# Patient Record
Sex: FEMALE | Race: BLACK OR AFRICAN AMERICAN | NOT HISPANIC OR LATINO | Employment: OTHER | URBAN - METROPOLITAN AREA
[De-identification: names, ages, dates, MRNs, and addresses within clinical notes are randomized per-mention and may not be internally consistent; named-entity substitution may affect disease eponyms.]

---

## 2017-02-07 ENCOUNTER — OUTSIDE FACILITY SERVICE (OUTPATIENT)
Dept: FAMILY MEDICINE CLINIC | Facility: CLINIC | Age: 77
End: 2017-02-07

## 2017-02-07 PROCEDURE — 99308 SBSQ NF CARE LOW MDM 20: CPT | Performed by: INTERNAL MEDICINE

## 2017-02-09 RX ORDER — OXYCODONE AND ACETAMINOPHEN 10; 325 MG/1; MG/1
TABLET ORAL
Qty: 180 TABLET | Refills: 0 | Status: SHIPPED | OUTPATIENT
Start: 2017-02-09 | End: 2017-03-23 | Stop reason: SDUPTHER

## 2017-03-07 ENCOUNTER — OUTSIDE FACILITY SERVICE (OUTPATIENT)
Dept: FAMILY MEDICINE CLINIC | Facility: CLINIC | Age: 77
End: 2017-03-07

## 2017-03-07 PROCEDURE — 99307 SBSQ NF CARE SF MDM 10: CPT | Performed by: NURSE PRACTITIONER

## 2017-03-23 RX ORDER — OXYCODONE AND ACETAMINOPHEN 10; 325 MG/1; MG/1
TABLET ORAL
Qty: 180 TABLET | Refills: 0 | Status: SHIPPED | OUTPATIENT
Start: 2017-03-23 | End: 2017-04-27 | Stop reason: SDUPTHER

## 2017-03-28 ENCOUNTER — OUTSIDE FACILITY SERVICE (OUTPATIENT)
Dept: FAMILY MEDICINE CLINIC | Facility: CLINIC | Age: 77
End: 2017-03-28

## 2017-03-28 PROCEDURE — 99308 SBSQ NF CARE LOW MDM 20: CPT | Performed by: INTERNAL MEDICINE

## 2017-04-25 ENCOUNTER — OUTSIDE FACILITY SERVICE (OUTPATIENT)
Dept: FAMILY MEDICINE CLINIC | Facility: CLINIC | Age: 77
End: 2017-04-25

## 2017-04-25 PROCEDURE — 99308 SBSQ NF CARE LOW MDM 20: CPT | Performed by: NURSE PRACTITIONER

## 2017-04-27 RX ORDER — OXYCODONE AND ACETAMINOPHEN 10; 325 MG/1; MG/1
TABLET ORAL
Qty: 180 TABLET | Refills: 0 | Status: SHIPPED | OUTPATIENT
Start: 2017-04-27 | End: 2017-05-30

## 2017-05-18 ENCOUNTER — OUTSIDE FACILITY SERVICE (OUTPATIENT)
Dept: FAMILY MEDICINE CLINIC | Facility: CLINIC | Age: 77
End: 2017-05-18

## 2017-05-18 PROCEDURE — G0439 PPPS, SUBSEQ VISIT: HCPCS | Performed by: NURSE PRACTITIONER

## 2017-05-25 ENCOUNTER — OUTSIDE FACILITY SERVICE (OUTPATIENT)
Dept: FAMILY MEDICINE CLINIC | Facility: CLINIC | Age: 77
End: 2017-05-25

## 2017-05-25 PROCEDURE — 99308 SBSQ NF CARE LOW MDM 20: CPT | Performed by: NURSE PRACTITIONER

## 2017-05-30 RX ORDER — OXYCODONE AND ACETAMINOPHEN 10; 325 MG/1; MG/1
1 TABLET ORAL
Qty: 180 TABLET | Refills: 0 | Status: SHIPPED | OUTPATIENT
Start: 2017-05-30 | End: 2017-06-16 | Stop reason: SDUPTHER

## 2017-06-17 RX ORDER — OXYCODONE AND ACETAMINOPHEN 10; 325 MG/1; MG/1
1 TABLET ORAL
Qty: 180 TABLET | Refills: 0 | Status: SHIPPED | OUTPATIENT
Start: 2017-06-17 | End: 2017-07-05 | Stop reason: SDUPTHER

## 2017-06-27 ENCOUNTER — OUTSIDE FACILITY SERVICE (OUTPATIENT)
Dept: FAMILY MEDICINE CLINIC | Facility: CLINIC | Age: 77
End: 2017-06-27

## 2017-06-27 PROCEDURE — 99308 SBSQ NF CARE LOW MDM 20: CPT | Performed by: INTERNAL MEDICINE

## 2017-07-05 RX ORDER — OXYCODONE AND ACETAMINOPHEN 10; 325 MG/1; MG/1
1 TABLET ORAL
Qty: 180 TABLET | Refills: 0 | Status: SHIPPED | OUTPATIENT
Start: 2017-07-05

## 2017-08-04 ENCOUNTER — OUTSIDE FACILITY SERVICE (OUTPATIENT)
Dept: FAMILY MEDICINE CLINIC | Facility: CLINIC | Age: 77
End: 2017-08-04

## 2017-08-04 PROCEDURE — 99308 SBSQ NF CARE LOW MDM 20: CPT | Performed by: NURSE PRACTITIONER

## 2017-12-22 ENCOUNTER — HOSPITAL ENCOUNTER (EMERGENCY)
Facility: HOSPITAL | Age: 77
Discharge: HOME | End: 2017-12-22
Attending: FAMILY MEDICINE | Admitting: FAMILY MEDICINE

## 2017-12-22 VITALS
RESPIRATION RATE: 18 BRPM | TEMPERATURE: 97.5 F | OXYGEN SATURATION: 100 % | HEIGHT: 69 IN | SYSTOLIC BLOOD PRESSURE: 154 MMHG | HEART RATE: 69 BPM | DIASTOLIC BLOOD PRESSURE: 78 MMHG

## 2017-12-22 DIAGNOSIS — M79.89 SWOLLEN FINGER: Primary | ICD-10-CM

## 2017-12-22 PROCEDURE — 99284 EMERGENCY DEPT VISIT MOD MDM: CPT

## 2017-12-22 RX ORDER — LIDOCAINE HYDROCHLORIDE 10 MG/ML
10 INJECTION, SOLUTION INFILTRATION; PERINEURAL ONCE
Status: DISCONTINUED | OUTPATIENT
Start: 2017-12-22 | End: 2017-12-22 | Stop reason: HOSPADM

## 2017-12-22 RX ORDER — MAGNESIUM HYDROXIDE/ALUMINUM HYDROXICE/SIMETHICONE 120; 1200; 1200 MG/30ML; MG/30ML; MG/30ML
20 SUSPENSION ORAL EVERY 6 HOURS PRN
COMMUNITY

## 2017-12-22 RX ORDER — GUAIFENESIN AND DEXTROMETHORPHAN HYDROBROMIDE 100; 10 MG/5ML; MG/5ML
10 SOLUTION ORAL EVERY 6 HOURS PRN
COMMUNITY

## 2017-12-22 RX ORDER — ONDANSETRON 4 MG/1
4 TABLET, FILM COATED ORAL EVERY 8 HOURS PRN
COMMUNITY

## 2017-12-22 RX ORDER — SUCRALFATE 1 G/1
1 TABLET ORAL 4 TIMES DAILY
COMMUNITY

## 2017-12-22 RX ORDER — DULOXETIN HYDROCHLORIDE 60 MG/1
60 CAPSULE, DELAYED RELEASE ORAL DAILY
COMMUNITY

## 2017-12-22 NOTE — ED NOTES
Pt arrived via [facility] from [facility]. She fell yesterday am and refused to go to ER for evaluation at that time. Pt has 4th finger pain with swelling. Pt has a ring that is tight and increasing her pain. Pt c/o headache, SOA and lower shin pain bilaterally to EMS. Sats 85% RA per EMS, placed on O2 @ 2L per NC.  0705 - Pt to room 14 - Sats checked on RA - 100%.  Ivonne Rose RN  12/22/17 0654       Ivonne Rose RN  12/22/17 0655       Ivonne Rose RN  12/22/17 0739

## 2017-12-22 NOTE — ED NOTES
Attempt to call [facility] to give return report. No answer after numerous rings. Will attempt again in 10 minutes.   [facility] notified of transfer needed. ETA 1100 am     Juan José Crawley RN  12/22/17 6885

## 2017-12-22 NOTE — ED PROVIDER NOTES
" EMERGENCY DEPARTMENT ENCOUNTER    CHIEF COMPLAINT  Chief Complaint: L fourth digit pain  History given by: Pt  History limited by: Nothing  Room Number: 14/14  PMD: Terese Song MD      HPI:  Pt is a 77 y.o. female with a hx of UTI and hypertension who presents complaining of L fourth digit pain secondary to a ring getting stuck onset one week ago. Pt denies nausea, vomiting, diarrhea, or any other symptoms at this time.    Duration:  One week  Onset: gradual  Timing: constant  Location: L fourth digit  Radiation: none  Quality: \"pain\"  Intensity/Severity: moderate  Progression: unchanged  Associated Symptoms: none  Aggravating Factors: none  Alleviating Factors: none  Previous Episodes: none  Treatment before arrival: Pt received no treatment PTA.    PAST MEDICAL HISTORY  Active Ambulatory Problems     Diagnosis Date Noted   • Possible Cerebrovascular accident (CVA) 12/25/2016   • Anemia, chronic disease 10/17/2012   • Chest pain on breathing 09/05/2013   • History of gastrointestinal hemorrhage 09/05/2013   • History of deep venous thrombosis 09/05/2013   • Hypercoagulable state 10/17/2012   • Labile hypertension 10/17/2012   • Hypersomnia 10/16/1991   • Iron deficiency anemia 09/11/2013   • Adiposity 10/17/2012   • Osteoarthritis 10/17/2012   • Transient alteration of awareness 12/25/2016   • Toxic metabolic encephalopathy multifactorial: volume dep., central acting drugs/UTI recurrent 12/25/2016   • Chronic tension-type headache, not intractable 12/25/2016   • Generalized weakness 12/25/2016   • Muscle twitching 12/25/2016   • Chronic kidney disease, stage II (mild) 12/25/2016   • Alzheimer's disease 12/25/2016   • Diverticulosis of large intestine 12/25/2016   • Primary narcolepsy without cataplexy 12/25/2016   • Manic depressive disorder 12/25/2016   • Decreased mobility 12/25/2016   • Anxiety and depression 12/25/2016   • UTI due to extended-spectrum beta lactamase (ESBL) producing Escherichia coli " recurrent 12/25/2016   • Risk for falls 12/25/2016     Resolved Ambulatory Problems     Diagnosis Date Noted   • No Resolved Ambulatory Problems     Past Medical History:   Diagnosis Date   • Abdominal hernia without obstruction and without gangrene    • Altered mental state    • Alzheimer disease    • Anemia    • Anxiety    • Dementia    • Depression    • Diaphragmatic hernia    • Difficulty walking    • Diverticulitis large intestine w/o perforation or abscess w/o bleeding    • ESBL (extended spectrum beta-lactamase) producing bacteria infection    • Falls    • Gastritis    • GI bleed    • Hypertension    • Karli    • Narcolepsy due to underlying condition without cataplexy    • UTI (urinary tract infection)        PAST SURGICAL HISTORY  Past Surgical History:   Procedure Laterality Date   • CHOLECYSTECTOMY  08/15/2008   • CYSTOSCOPY      multiple   • HEMORROIDECTOMY     • LAPAROSCOPIC HYSTERECTOMY  01/01/2000   • VENA CAVA FILTER INSERTION  2012       FAMILY HISTORY  Family History   Problem Relation Age of Onset   • Diabetes Other    • Coronary artery disease Other        SOCIAL HISTORY  Social History     Social History   • Marital status:      Spouse name: N/A   • Number of children: N/A   • Years of education: N/A     Occupational History   • Retired [employer]       Social History Main Topics   • Smoking status: Former Smoker     Packs/day: 1.00     Types: Cigarettes     Start date: 1960     Quit date: 1975   • Smokeless tobacco: Not on file      Comment: EX-SMOKER   • Alcohol use No   • Drug use: No   • Sexual activity: Not Currently     Partners: Male     Other Topics Concern   • Not on file     Social History Narrative       ALLERGIES  Levaquin [levofloxacin]; Macrobid [nitrofurantoin monohyd macro]; Penicillins; and Sulfa antibiotics    REVIEW OF SYSTEMS  Review of Systems   Constitutional: Negative for fever.   HENT: Negative for sore throat.    Eyes: Negative.    Respiratory: Negative  for cough and shortness of breath.    Cardiovascular: Negative for chest pain.   Gastrointestinal: Negative for abdominal pain, diarrhea and vomiting.   Genitourinary: Negative for dysuria.   Musculoskeletal: Positive for myalgias (L fourth digit). Negative for neck pain.   Skin: Negative for rash.   Allergic/Immunologic: Negative.    Neurological: Negative for weakness, numbness and headaches.   Hematological: Negative.    Psychiatric/Behavioral: Negative.    All other systems reviewed and are negative.      PHYSICAL EXAM  ED Triage Vitals   Temp Pulse Resp BP SpO2   -- -- -- -- --             Temp src Heart Rate Source Patient Position BP Location FiO2 (%)   -- -- -- -- --              Physical Exam   Constitutional: She is oriented to person, place, and time and well-developed, well-nourished, and in no distress. No distress.   HENT:   Head: Normocephalic and atraumatic.   Eyes: EOM are normal. Pupils are equal, round, and reactive to light.   Neck: Normal range of motion. Neck supple.   Cardiovascular: Normal rate, regular rhythm and normal heart sounds.    Pulmonary/Chest: Effort normal and breath sounds normal. No respiratory distress.   Abdominal: Soft. There is no tenderness. There is no rebound and no guarding.   Musculoskeletal: Normal range of motion. She exhibits no edema.   Neurological: She is alert and oriented to person, place, and time. She has normal sensation and normal strength.   Skin: Skin is warm and dry. No rash noted.   Pt has marked swelling distal to the ring of the fourth finger.   Psychiatric: Mood and affect normal.   Nursing note and vitals reviewed.      PROCEDURES  Procedures      PROGRESS AND CONSULTS  ED Course     0747 Ordered lidocaine to numb pt's finger prior to removal of her ring.    Ring cut off.    0820 BP- 134/64 HR- 80 Temp- 97.5 °F (36.4 °C) (Oral) O2 sat- 100%. Rechecked the patient who is in NAD and is resting comfortably. Suggested pt return to the ED for new or  worsening symptoms. Will discharge. Pt understands and agrees with the plan. All questions answered.    MEDICAL DECISION MAKING  Results were reviewed/discussed with the patient and they were also made aware of online access. Pt also made aware that some labs, such as cultures, will not be resulted during ER visit and follow up with PMD is necessary.     MDM  Number of Diagnoses or Management Options  Swollen finger due to constricting ring:      Amount and/or Complexity of Data Reviewed  Decide to obtain previous medical records or to obtain history from someone other than the patient: yes  Review and summarize past medical records: yes  Independent visualization of images, tracings, or specimens: yes    Patient Progress  Patient progress: stable         DIAGNOSIS  Final diagnoses:   Swollen finger due to constricting ring       DISPOSITION  DISCHARGE    Patient discharged in stable condition.    Reviewed implications of results, diagnosis, meds, responsibility to follow up, warning signs and symptoms of possible worsening, potential complications and reasons to return to ER, including new or worsening symptoms.    Patient/Family voiced understanding of above instructions.    Discussed plan for discharge, as there is no emergent indication for admission.  Pt/family is agreeable and understands need for follow up and repeat testing.  Pt is aware that discharge does not mean that nothing is wrong but it indicates no emergency is present that requires admission and they must continue care with follow-up as given below or physician of their choice.     FOLLOW-UP  Terese Song MD  [id number] [id number] [phone]      As needed         Medication List      Stop          aluminum-magnesium hydroxide-simethicone 200-200-20 MG/5ML suspension   Commonly known as:  MAALOX/MYLANTA               Latest Documented Vital Signs:  As of 8:25 AM  BP- 134/64 HR- 80 Temp- 97.5 °F (36.4 °C)  (Oral) O2 sat- 100%    --  Documentation assistance provided by patrica Tello for Dr. Valenzuela.  Information recorded by the scribe was done at my direction and has been verified and validated by me.     Katherin Tello  12/22/17 9101       Leonard Valenzuela MD  12/22/17 7066

## 2021-05-26 ENCOUNTER — HOSPITAL ENCOUNTER (EMERGENCY)
Facility: HOSPITAL | Age: 81
Discharge: NURSING, REHAB OR LONG-TERM CARE | End: 2021-05-26
Attending: EMERGENCY MEDICINE | Admitting: EMERGENCY MEDICINE

## 2021-05-26 ENCOUNTER — APPOINTMENT (OUTPATIENT)
Dept: CT IMAGING | Facility: HOSPITAL | Age: 81
End: 2021-05-26

## 2021-05-26 ENCOUNTER — APPOINTMENT (OUTPATIENT)
Dept: GENERAL RADIOLOGY | Facility: HOSPITAL | Age: 81
End: 2021-05-26

## 2021-05-26 VITALS
SYSTOLIC BLOOD PRESSURE: 104 MMHG | OXYGEN SATURATION: 96 % | DIASTOLIC BLOOD PRESSURE: 57 MMHG | HEART RATE: 49 BPM | TEMPERATURE: 97.6 F | RESPIRATION RATE: 16 BRPM

## 2021-05-26 DIAGNOSIS — R41.89 EPISODE OF UNRESPONSIVENESS: Primary | ICD-10-CM

## 2021-05-26 LAB
ALBUMIN SERPL-MCNC: 4 G/DL (ref 3.5–5.2)
ALBUMIN/GLOB SERPL: 1.2 G/DL
ALP SERPL-CCNC: 84 U/L (ref 39–117)
ALT SERPL W P-5'-P-CCNC: 13 U/L (ref 1–33)
ANION GAP SERPL CALCULATED.3IONS-SCNC: 9.8 MMOL/L (ref 5–15)
AST SERPL-CCNC: 15 U/L (ref 1–32)
BASOPHILS # BLD AUTO: 0.02 10*3/MM3 (ref 0–0.2)
BASOPHILS NFR BLD AUTO: 0.3 % (ref 0–1.5)
BILIRUB SERPL-MCNC: 0.2 MG/DL (ref 0–1.2)
BUN SERPL-MCNC: 19 MG/DL (ref 8–23)
BUN/CREAT SERPL: 17.6 (ref 7–25)
CALCIUM SPEC-SCNC: 9.6 MG/DL (ref 8.6–10.5)
CHLORIDE SERPL-SCNC: 104 MMOL/L (ref 98–107)
CO2 SERPL-SCNC: 22.2 MMOL/L (ref 22–29)
CREAT SERPL-MCNC: 1.08 MG/DL (ref 0.57–1)
DEPRECATED RDW RBC AUTO: 42.7 FL (ref 37–54)
EOSINOPHIL # BLD AUTO: 0.17 10*3/MM3 (ref 0–0.4)
EOSINOPHIL NFR BLD AUTO: 2.4 % (ref 0.3–6.2)
ERYTHROCYTE [DISTWIDTH] IN BLOOD BY AUTOMATED COUNT: 12.4 % (ref 12.3–15.4)
GFR SERPL CREATININE-BSD FRML MDRD: 59 ML/MIN/1.73
GLOBULIN UR ELPH-MCNC: 3.4 GM/DL
GLUCOSE SERPL-MCNC: 97 MG/DL (ref 65–99)
HCT VFR BLD AUTO: 37.9 % (ref 34–46.6)
HGB BLD-MCNC: 11.8 G/DL (ref 12–15.9)
IMM GRANULOCYTES # BLD AUTO: 0.02 10*3/MM3 (ref 0–0.05)
IMM GRANULOCYTES NFR BLD AUTO: 0.3 % (ref 0–0.5)
LYMPHOCYTES # BLD AUTO: 2.92 10*3/MM3 (ref 0.7–3.1)
LYMPHOCYTES NFR BLD AUTO: 40.6 % (ref 19.6–45.3)
MCH RBC QN AUTO: 28.9 PG (ref 26.6–33)
MCHC RBC AUTO-ENTMCNC: 31.1 G/DL (ref 31.5–35.7)
MCV RBC AUTO: 92.9 FL (ref 79–97)
MONOCYTES # BLD AUTO: 0.62 10*3/MM3 (ref 0.1–0.9)
MONOCYTES NFR BLD AUTO: 8.6 % (ref 5–12)
NEUTROPHILS NFR BLD AUTO: 3.45 10*3/MM3 (ref 1.7–7)
NEUTROPHILS NFR BLD AUTO: 47.8 % (ref 42.7–76)
NRBC BLD AUTO-RTO: 0 /100 WBC (ref 0–0.2)
PLATELET # BLD AUTO: 331 10*3/MM3 (ref 140–450)
PMV BLD AUTO: 9.7 FL (ref 6–12)
POTASSIUM SERPL-SCNC: 3.8 MMOL/L (ref 3.5–5.2)
PROT SERPL-MCNC: 7.4 G/DL (ref 6–8.5)
QT INTERVAL: 445 MS
RBC # BLD AUTO: 4.08 10*6/MM3 (ref 3.77–5.28)
SODIUM SERPL-SCNC: 136 MMOL/L (ref 136–145)
TROPONIN T SERPL-MCNC: <0.01 NG/ML (ref 0–0.03)
WBC # BLD AUTO: 7.2 10*3/MM3 (ref 3.4–10.8)

## 2021-05-26 PROCEDURE — 80053 COMPREHEN METABOLIC PANEL: CPT | Performed by: NURSE PRACTITIONER

## 2021-05-26 PROCEDURE — 93010 ELECTROCARDIOGRAM REPORT: CPT | Performed by: INTERNAL MEDICINE

## 2021-05-26 PROCEDURE — 85025 COMPLETE CBC W/AUTO DIFF WBC: CPT | Performed by: NURSE PRACTITIONER

## 2021-05-26 PROCEDURE — 70450 CT HEAD/BRAIN W/O DYE: CPT

## 2021-05-26 PROCEDURE — 99284 EMERGENCY DEPT VISIT MOD MDM: CPT

## 2021-05-26 PROCEDURE — 93005 ELECTROCARDIOGRAM TRACING: CPT | Performed by: NURSE PRACTITIONER

## 2021-05-26 PROCEDURE — 71045 X-RAY EXAM CHEST 1 VIEW: CPT

## 2021-05-26 PROCEDURE — 84484 ASSAY OF TROPONIN QUANT: CPT | Performed by: NURSE PRACTITIONER

## 2021-05-26 RX ORDER — SODIUM CHLORIDE 0.9 % (FLUSH) 0.9 %
10 SYRINGE (ML) INJECTION AS NEEDED
Status: DISCONTINUED | OUTPATIENT
Start: 2021-05-26 | End: 2021-05-26 | Stop reason: HOSPADM

## 2021-05-26 NOTE — ED NOTES
"Pt refused head CT stating \"aint nothing wrong with my head\" Dionna ROBBINS notified.      Rodolfo Pace RN  05/26/21 1186    "

## 2021-05-26 NOTE — ED NOTES
[facility] contacted about pt discharge and transport back to their facility. [facility] agreeable.      Rodolfo Pace RN  05/26/21 6661

## 2021-05-26 NOTE — ED PROVIDER NOTES
Pt presents to the ED from [facility] for an episode of unresponsiveness.  Staff at the nursing home reports they were unable to feel a pulse, however after 1 chest compressions the patient woke up and responded immediately.  She has a history of dementia and is unable to give any sort of reliable history.  She denies pain-specifically, she denies headache, chest pain or trouble breathing.     On exam,   Awake and alert.  She is pleasantly confused.  She is disoriented to place and time.  HEENT-normocephalic, atraumatic.  CV-regular rhythm and rate  Lungs-clear to auscultation bilaterally  Neuro-pleasantly confused as above, she has no focal neurologic deficits.  Extremities-no deformities.     Plan: Her labs are unremarkable.  She initially was refusing head CT, but family was contacted and she is agreeable to this.  I think this is normal she can be safely discharged back to her facility.      Appropriate PPE was worn by myself and the patient that her entire interaction.       Attestation:  The DEBI and I have discussed this patient's history, physical exam, and treatment plan.  I have reviewed the documentation and personally had a face to face interaction with the patient. I affirm the documentation and agree with the treatment and plan.  The attached note describes my personal findings.            Venkatesh Hall MD  05/26/21 3881

## 2021-05-26 NOTE — ED PROVIDER NOTES
EMERGENCY DEPARTMENT ENCOUNTER    Room Number:  26/26  Date of encounter:  5/26/2021  PCP: Terese Song MD  Historian: patient   Full history not obtainable due to: dementia     HPI:  Chief Complaint: ams     Context: Madeleine Tavares is a 80 y.o. female who presents to the ED c/o ams onset pta. Staff at the facility where she resides, [facility], reported they could not wake the pt. They were concerned she did not have a pulse and began cpr. After one chest compression the pt awoke screaming. The pt does not have any memory of this event. She does have a hx of dementia and is confused at baseline. She thinks she lives at home and is unsure of the year.     The hx is limited.         PAST MEDICAL HISTORY    Active Ambulatory Problems     Diagnosis Date Noted   • Possible Cerebrovascular accident (CVA) 12/25/2016   • Anemia, chronic disease 10/17/2012   • Chest pain on breathing 09/05/2013   • History of gastrointestinal hemorrhage 09/05/2013   • History of deep venous thrombosis 09/05/2013   • Hypercoagulable state (CMS/HCC) 10/17/2012   • Labile hypertension 10/17/2012   • Hypersomnia 10/16/1991   • Iron deficiency anemia 09/11/2013   • Adiposity 10/17/2012   • Osteoarthritis 10/17/2012   • Transient alteration of awareness 12/25/2016   • Toxic metabolic encephalopathy multifactorial: volume dep., central acting drugs/UTI recurrent 12/25/2016   • Chronic tension-type headache, not intractable 12/25/2016   • Generalized weakness 12/25/2016   • Muscle twitching 12/25/2016   • Chronic kidney disease, stage II (mild) 12/25/2016   • Alzheimer's disease (CMS/HCC) 12/25/2016   • Diverticulosis of large intestine 12/25/2016   • Primary narcolepsy without cataplexy 12/25/2016   • Manic depressive disorder (CMS/HCC) 12/25/2016   • Decreased mobility 12/25/2016   • Anxiety and depression 12/25/2016   • UTI due to extended-spectrum beta lactamase (ESBL) producing Escherichia coli recurrent 12/25/2016   • Risk for  falls 2016     Resolved Ambulatory Problems     Diagnosis Date Noted   • No Resolved Ambulatory Problems     Past Medical History:   Diagnosis Date   • Abdominal hernia without obstruction and without gangrene    • Altered mental state    • Alzheimer disease    • Anemia    • Anxiety    • Dementia    • Depression    • Diaphragmatic hernia    • Difficulty walking    • Diverticulitis large intestine w/o perforation or abscess w/o bleeding    • ESBL (extended spectrum beta-lactamase) producing bacteria infection    • Falls    • Gastritis    • GI bleed    • Hypertension    • Karli (CMS/HCC)    • Narcolepsy due to underlying condition without cataplexy    • UTI (urinary tract infection)          PAST SURGICAL HISTORY  Past Surgical History:   Procedure Laterality Date   • CHOLECYSTECTOMY  08/15/2008   • CYSTOSCOPY      multiple   • HEMORROIDECTOMY     • LAPAROSCOPIC HYSTERECTOMY  2000   • VENA CAVA FILTER INSERTION           FAMILY HISTORY  Family History   Problem Relation Age of Onset   • Diabetes Other    • Coronary artery disease Other          SOCIAL HISTORY  Social History     Socioeconomic History   • Marital status:      Spouse name: Not on file   • Number of children: Not on file   • Years of education: Not on file   • Highest education level: Not on file   Tobacco Use   • Smoking status: Former Smoker     Packs/day: 1.00     Types: Cigarettes     Start date:      Quit date:      Years since quittin.4   • Tobacco comment: EX-SMOKER   Substance and Sexual Activity   • Alcohol use: No   • Drug use: No   • Sexual activity: Not Currently     Partners: Male         ALLERGIES  Levaquin [levofloxacin], Macrobid [nitrofurantoin monohyd macro], Penicillins, and Sulfa antibiotics        REVIEW OF SYSTEMS  Review of Systems   All systems reviewed and marked as negative except as listed in HPI       PHYSICAL EXAM    I have reviewed the triage vital signs and nursing notes.    ED Triage  Vitals [05/26/21 1115]   Temp Heart Rate Resp BP SpO2   97.8 °F (36.6 °C) 52 18 127/66 94 %      Temp src Heart Rate Source Patient Position BP Location FiO2 (%)   -- -- -- -- --       GENERAL: alert well developed, well nourished in no distress. Pleasantly confused . Elderly appearing   HENT: NCAT, neck supple, trachea midline  EYES: no scleral icterus, left pupil briskly reactive, right pupil and cornea cloudy and obscured, normal conjunctivae  CV: regular rhythm, regular rate, no murmur  RESPIRATORY: unlabored effort, CTAB  ABDOMEN: soft, nontender, nondistended, bowel sounds present  MUSCULOSKELETAL: no gross deformity  NEURO: alert,  Moves all extremities, generalized weakness noted but no focal deficit, speech clear, mental status normal/baseline  SKIN: warm, dry, no rash  PSYCH:  Appropriate mood and affect    Vital signs and nursing notes reviewed.          LAB RESULTS  Recent Results (from the past 24 hour(s))   Comprehensive Metabolic Panel    Collection Time: 05/26/21 11:35 AM    Specimen: Blood   Result Value Ref Range    Glucose 97 65 - 99 mg/dL    BUN 19 8 - 23 mg/dL    Creatinine 1.08 (H) 0.57 - 1.00 mg/dL    Sodium 136 136 - 145 mmol/L    Potassium 3.8 3.5 - 5.2 mmol/L    Chloride 104 98 - 107 mmol/L    CO2 22.2 22.0 - 29.0 mmol/L    Calcium 9.6 8.6 - 10.5 mg/dL    Total Protein 7.4 6.0 - 8.5 g/dL    Albumin 4.00 3.50 - 5.20 g/dL    ALT (SGPT) 13 1 - 33 U/L    AST (SGOT) 15 1 - 32 U/L    Alkaline Phosphatase 84 39 - 117 U/L    Total Bilirubin 0.2 0.0 - 1.2 mg/dL    eGFR  African Amer 59 (L) >60 mL/min/1.73    Globulin 3.4 gm/dL    A/G Ratio 1.2 g/dL    BUN/Creatinine Ratio 17.6 7.0 - 25.0    Anion Gap 9.8 5.0 - 15.0 mmol/L   Troponin    Collection Time: 05/26/21 11:35 AM    Specimen: Blood   Result Value Ref Range    Troponin T <0.010 0.000 - 0.030 ng/mL   CBC Auto Differential    Collection Time: 05/26/21 11:35 AM    Specimen: Blood   Result Value Ref Range    WBC 7.20 3.40 - 10.80 10*3/mm3    RBC  4.08 3.77 - 5.28 10*6/mm3    Hemoglobin 11.8 (L) 12.0 - 15.9 g/dL    Hematocrit 37.9 34.0 - 46.6 %    MCV 92.9 79.0 - 97.0 fL    MCH 28.9 26.6 - 33.0 pg    MCHC 31.1 (L) 31.5 - 35.7 g/dL    RDW 12.4 12.3 - 15.4 %    RDW-SD 42.7 37.0 - 54.0 fl    MPV 9.7 6.0 - 12.0 fL    Platelets 331 140 - 450 10*3/mm3    Neutrophil % 47.8 42.7 - 76.0 %    Lymphocyte % 40.6 19.6 - 45.3 %    Monocyte % 8.6 5.0 - 12.0 %    Eosinophil % 2.4 0.3 - 6.2 %    Basophil % 0.3 0.0 - 1.5 %    Immature Grans % 0.3 0.0 - 0.5 %    Neutrophils, Absolute 3.45 1.70 - 7.00 10*3/mm3    Lymphocytes, Absolute 2.92 0.70 - 3.10 10*3/mm3    Monocytes, Absolute 0.62 0.10 - 0.90 10*3/mm3    Eosinophils, Absolute 0.17 0.00 - 0.40 10*3/mm3    Basophils, Absolute 0.02 0.00 - 0.20 10*3/mm3    Immature Grans, Absolute 0.02 0.00 - 0.05 10*3/mm3    nRBC 0.0 0.0 - 0.2 /100 WBC   ECG 12 Lead    Collection Time: 05/26/21 11:47 AM   Result Value Ref Range    QT Interval 445 ms       Ordered the above labs and independently reviewed the results.        RADIOLOGY  CT Head Without Contrast    Result Date: 5/26/2021  CT HEAD WITHOUT CONTRAST  HISTORY: Altered mental status.  COMPARISON: 12/26/2016.  FINDINGS: There is age-appropriate atrophy. There is no evidence of acute infarction, intracranial hemorrhage, hydrocephalus or of abnormal extra-axial fluid. Decreased attenuation involving the white matter of the cerebral hemispheres is noted bilaterally consistent with mild small vessel ischemic disease. No focal area of decreased attenuation to suggest acute infarction is identified. A scleral band is noted on the right. If indicated, further evaluation could be performed with MRI examination of brain.    Radiation dose reduction techniques were utilized, including automated exposure control and exposure modulation based on body size.  This report was finalized on 5/26/2021 2:31 PM by Dr. Martin Samaniego M.D.      XR Chest 1 View    Result Date: 5/26/2021  XR CHEST 1 VW-   05/26/2021  HISTORY: Altered mental status.  Heart size is within normal limits. Lungs appear free of acute infiltrates. A hiatal hernia is seen.  Bones and soft tissues are unremarkable.      No acute process. 2. Hiatal hernia.  This report was finalized on 5/26/2021 12:11 PM by Dr. Jose Newsome M.D.        I ordered the above noted radiological studies. Independently reviewed by me and discussed with radiologist.  See dictation above for official radiology interpretation.      PROCEDURES    Procedures        MEDICATIONS GIVEN IN ER    Medications   sodium chloride 0.9 % flush 10 mL (has no administration in time range)         PROGRESS, DATA ANALYSIS, CONSULTS, AND MEDICAL DECISION MAKING    All labs have been independently reviewed by me.  All radiology studies have been reviewed by me.   EKG's independently reviewed by me.  Discussion below represents my analysis of pertinent findings related to patient's condition, differential diagnosis, treatment plan and final disposition.    DIFFERENTIAL DIAGNOSIS INCLUDE BUT NOT LIMITED TO: Metabolic encephalopathy, SIMEON, dehydration, anemia, arrhythmia, AMI, USA, viral syndrome, COVID-19, pneumonia,, subarachnoid hemorrhage, subdural hematoma, brain tumor, withdrawal, polypharmacy      ED Course as of May 26 1757   Wed May 26, 2021   1234 BUN: 19 [JS]   1234 Creatinine(!): 1.08 [JS]   1234 Troponin T: <0.010 [JS]   1234 WBC: 7.20 [JS]   1234 Hemoglobin(!): 11.8 [JS]   1240 Patient is declining to have a CT.    [WC]   1252 EKG performed at 1147 and interpreted by me shows sinus bradycardia with a prolonged IA interval consistent with first-degree AV block.  QRS complexes and ST-T segments are unremarkable.  There is no significant change when compared to 12/27/2016.    [WC]   1355 CT of the brain was independently viewed by me and discussed with Dr. Samaniego (radiology)-there are no acute processes identified.  For official interpretation, see dictated report.    [WC]       ED Course User Index  [JS] Milly Post APRN  [WC] Venkatesh Hall MD       AS OF 17:57 EDT VITALS:        BP - 104/57  HR - (!) 49  TEMP - 97.6 °F (36.4 °C) (Oral)  O2 SATS - 96%        DIAGNOSIS  Final diagnoses:   Episode of unresponsiveness         DISPOSITION  Discharge     Pt masked in first look. I wore appropriate PPE throughout my encounters with the pt. I performed hand hygiene on entry into the pt room and upon exit.     Dictated utilizing Dragon dictation:  Much of this encounter note is an electronic transcription/translation of spoken language to printed text. The electronic translation of spoken language may permit erroneous, or at times, nonsensical words or phrases to be inadvertently transcribed; Although I have reviewed the note for such errors, some may still exist.     Milly Post APRN  05/26/21 8674

## 2021-05-26 NOTE — ED NOTES
Pt refusing to be catheterized for a urine sample at this time. MD Rafael aware. He is okay without a urine sample at this time.      Uyen Mehta, RN  05/26/21 1744

## 2021-05-26 NOTE — ED NOTES
Pt to ER via [facility] from [facility]. Pt found unresponsive. Nursing facility did one set of compressions due to them saying pt had no pulse - pt responded immediately. Pt hx of dementia - A&Ox1. Pt is baseline    Pt wearing mask. RN wearing mask, face shield and gown.         Kaylen Emanuel, RN  05/26/21 4185

## 2025-07-01 ENCOUNTER — APPOINTMENT (OUTPATIENT)
Dept: GENERAL RADIOLOGY | Facility: HOSPITAL | Age: 85
End: 2025-07-01
Payer: MEDICARE

## 2025-07-01 ENCOUNTER — APPOINTMENT (OUTPATIENT)
Dept: CT IMAGING | Facility: HOSPITAL | Age: 85
End: 2025-07-01
Payer: MEDICARE

## 2025-07-01 ENCOUNTER — HOSPITAL ENCOUNTER (INPATIENT)
Facility: HOSPITAL | Age: 85
LOS: 6 days | Discharge: HOME | End: 2025-07-08
Attending: EMERGENCY MEDICINE | Admitting: INTERNAL MEDICINE
Payer: MEDICARE

## 2025-07-01 DIAGNOSIS — N30.00 ACUTE CYSTITIS WITHOUT HEMATURIA: Primary | ICD-10-CM

## 2025-07-01 DIAGNOSIS — R93.0 ABNORMAL HEAD CT: ICD-10-CM

## 2025-07-01 DIAGNOSIS — R41.82 ALTERED MENTAL STATUS, UNSPECIFIED ALTERED MENTAL STATUS TYPE: ICD-10-CM

## 2025-07-01 PROBLEM — E16.2 HYPOGLYCEMIA: Status: ACTIVE | Noted: 2025-07-01

## 2025-07-01 PROBLEM — E11.9 DM (DIABETES MELLITUS): Status: ACTIVE | Noted: 2025-07-01

## 2025-07-01 PROBLEM — N39.0 UTI (URINARY TRACT INFECTION): Status: ACTIVE | Noted: 2025-07-01

## 2025-07-01 LAB
ALBUMIN SERPL-MCNC: 3.5 G/DL (ref 3.5–5.2)
ALBUMIN/GLOB SERPL: 0.8 G/DL
ALP SERPL-CCNC: 72 U/L (ref 39–117)
ALT SERPL W P-5'-P-CCNC: 6 U/L (ref 1–33)
ANION GAP SERPL CALCULATED.3IONS-SCNC: 10 MMOL/L (ref 5–15)
AST SERPL-CCNC: 18 U/L (ref 1–32)
BACTERIA UR QL AUTO: ABNORMAL /HPF
BASOPHILS # BLD AUTO: 0.03 10*3/MM3 (ref 0–0.2)
BASOPHILS NFR BLD AUTO: 0.5 % (ref 0–1.5)
BILIRUB SERPL-MCNC: 0.2 MG/DL (ref 0–1.2)
BILIRUB UR QL STRIP: NEGATIVE
BUN SERPL-MCNC: 13 MG/DL (ref 8–23)
BUN/CREAT SERPL: 12.4 (ref 7–25)
CALCIUM SPEC-SCNC: 9.7 MG/DL (ref 8.6–10.5)
CHLORIDE SERPL-SCNC: 104 MMOL/L (ref 98–107)
CLARITY UR: ABNORMAL
CO2 SERPL-SCNC: 25 MMOL/L (ref 22–29)
COLOR UR: YELLOW
CREAT SERPL-MCNC: 1.05 MG/DL (ref 0.57–1)
DEPRECATED RDW RBC AUTO: 38.7 FL (ref 37–54)
EGFRCR SERPLBLD CKD-EPI 2021: 52.8 ML/MIN/1.73
EOSINOPHIL # BLD AUTO: 0.22 10*3/MM3 (ref 0–0.4)
EOSINOPHIL NFR BLD AUTO: 3.8 % (ref 0.3–6.2)
ERYTHROCYTE [DISTWIDTH] IN BLOOD BY AUTOMATED COUNT: 12 % (ref 12.3–15.4)
GEN 5 1HR TROPONIN T REFLEX: 15 NG/L
GLOBULIN UR ELPH-MCNC: 4.2 GM/DL
GLUCOSE BLDC GLUCOMTR-MCNC: 94 MG/DL (ref 70–130)
GLUCOSE SERPL-MCNC: 99 MG/DL (ref 65–99)
GLUCOSE UR STRIP-MCNC: NEGATIVE MG/DL
GRAN CASTS URNS QL MICRO: PRESENT /LPF
HCT VFR BLD AUTO: 39.2 % (ref 34–46.6)
HGB BLD-MCNC: 12.2 G/DL (ref 12–15.9)
HGB UR QL STRIP.AUTO: NEGATIVE
HYALINE CASTS UR QL AUTO: ABNORMAL /LPF
IMM GRANULOCYTES # BLD AUTO: 0.02 10*3/MM3 (ref 0–0.05)
IMM GRANULOCYTES NFR BLD AUTO: 0.3 % (ref 0–0.5)
KETONES UR QL STRIP: NEGATIVE
LEUKOCYTE ESTERASE UR QL STRIP.AUTO: ABNORMAL
LYMPHOCYTES # BLD AUTO: 2.28 10*3/MM3 (ref 0.7–3.1)
LYMPHOCYTES NFR BLD AUTO: 39.6 % (ref 19.6–45.3)
MCH RBC QN AUTO: 27.4 PG (ref 26.6–33)
MCHC RBC AUTO-ENTMCNC: 31.1 G/DL (ref 31.5–35.7)
MCV RBC AUTO: 87.9 FL (ref 79–97)
MONOCYTES # BLD AUTO: 0.57 10*3/MM3 (ref 0.1–0.9)
MONOCYTES NFR BLD AUTO: 9.9 % (ref 5–12)
NEUTROPHILS NFR BLD AUTO: 2.64 10*3/MM3 (ref 1.7–7)
NEUTROPHILS NFR BLD AUTO: 45.9 % (ref 42.7–76)
NITRITE UR QL STRIP: POSITIVE
NRBC BLD AUTO-RTO: 0 /100 WBC (ref 0–0.2)
PH UR STRIP.AUTO: 8.5 [PH] (ref 5–8)
PLATELET # BLD AUTO: 381 10*3/MM3 (ref 140–450)
PMV BLD AUTO: 10 FL (ref 6–12)
POTASSIUM SERPL-SCNC: 4.7 MMOL/L (ref 3.5–5.2)
PROT SERPL-MCNC: 7.7 G/DL (ref 6–8.5)
PROT UR QL STRIP: ABNORMAL
RBC # BLD AUTO: 4.46 10*6/MM3 (ref 3.77–5.28)
RBC # UR STRIP: ABNORMAL /HPF
REF LAB TEST METHOD: ABNORMAL
SODIUM SERPL-SCNC: 139 MMOL/L (ref 136–145)
SP GR UR STRIP: 1.01 (ref 1–1.03)
SQUAMOUS #/AREA URNS HPF: ABNORMAL /HPF
TRI-PHOS CRY URNS QL MICRO: ABNORMAL /HPF
TROPONIN T % DELTA: 0
TROPONIN T NUMERIC DELTA: 0 NG/L
TROPONIN T SERPL HS-MCNC: 15 NG/L
UROBILINOGEN UR QL STRIP: ABNORMAL
WBC # UR STRIP: ABNORMAL /HPF
WBC CLUMPS # UR AUTO: PRESENT /HPF
WBC NRBC COR # BLD AUTO: 5.76 10*3/MM3 (ref 3.4–10.8)

## 2025-07-01 PROCEDURE — 81001 URINALYSIS AUTO W/SCOPE: CPT | Performed by: EMERGENCY MEDICINE

## 2025-07-01 PROCEDURE — 82948 REAGENT STRIP/BLOOD GLUCOSE: CPT

## 2025-07-01 PROCEDURE — 70450 CT HEAD/BRAIN W/O DYE: CPT

## 2025-07-01 PROCEDURE — 93005 ELECTROCARDIOGRAM TRACING: CPT | Performed by: EMERGENCY MEDICINE

## 2025-07-01 PROCEDURE — 87186 SC STD MICRODIL/AGAR DIL: CPT | Performed by: EMERGENCY MEDICINE

## 2025-07-01 PROCEDURE — 99285 EMERGENCY DEPT VISIT HI MDM: CPT

## 2025-07-01 PROCEDURE — 25010000002 CEFTRIAXONE PER 250 MG: Performed by: EMERGENCY MEDICINE

## 2025-07-01 PROCEDURE — 87088 URINE BACTERIA CULTURE: CPT | Performed by: EMERGENCY MEDICINE

## 2025-07-01 PROCEDURE — P9612 CATHETERIZE FOR URINE SPEC: HCPCS

## 2025-07-01 PROCEDURE — 84484 ASSAY OF TROPONIN QUANT: CPT | Performed by: EMERGENCY MEDICINE

## 2025-07-01 PROCEDURE — 87086 URINE CULTURE/COLONY COUNT: CPT | Performed by: EMERGENCY MEDICINE

## 2025-07-01 PROCEDURE — 93010 ELECTROCARDIOGRAM REPORT: CPT | Performed by: INTERNAL MEDICINE

## 2025-07-01 PROCEDURE — G0378 HOSPITAL OBSERVATION PER HR: HCPCS

## 2025-07-01 PROCEDURE — 80053 COMPREHEN METABOLIC PANEL: CPT | Performed by: EMERGENCY MEDICINE

## 2025-07-01 PROCEDURE — 85025 COMPLETE CBC W/AUTO DIFF WBC: CPT | Performed by: EMERGENCY MEDICINE

## 2025-07-01 PROCEDURE — 36415 COLL VENOUS BLD VENIPUNCTURE: CPT | Performed by: EMERGENCY MEDICINE

## 2025-07-01 PROCEDURE — 25010000002 MEROPENEM PER 100 MG: Performed by: INTERNAL MEDICINE

## 2025-07-01 PROCEDURE — 71045 X-RAY EXAM CHEST 1 VIEW: CPT

## 2025-07-01 RX ORDER — BISACODYL 10 MG
10 SUPPOSITORY, RECTAL RECTAL AS NEEDED
COMMUNITY

## 2025-07-01 RX ORDER — ALBUTEROL SULFATE 90 UG/1
2 INHALANT RESPIRATORY (INHALATION) EVERY 6 HOURS PRN
COMMUNITY

## 2025-07-01 RX ORDER — NITROGLYCERIN 0.4 MG/1
0.4 TABLET SUBLINGUAL
Status: DISCONTINUED | OUTPATIENT
Start: 2025-07-01 | End: 2025-07-01 | Stop reason: SDUPTHER

## 2025-07-01 RX ORDER — POLYETHYLENE GLYCOL 3350 17 G/17G
17 POWDER, FOR SOLUTION ORAL DAILY PRN
Status: DISCONTINUED | OUTPATIENT
Start: 2025-07-01 | End: 2025-07-08 | Stop reason: HOSPADM

## 2025-07-01 RX ORDER — NITROGLYCERIN 0.4 MG/1
0.4 TABLET SUBLINGUAL
Status: DISCONTINUED | OUTPATIENT
Start: 2025-07-01 | End: 2025-07-08 | Stop reason: HOSPADM

## 2025-07-01 RX ORDER — LACTULOSE 10 G/15ML
20 SOLUTION ORAL 2 TIMES DAILY PRN
COMMUNITY

## 2025-07-01 RX ORDER — AMOXICILLIN 250 MG
2 CAPSULE ORAL DAILY
COMMUNITY

## 2025-07-01 RX ORDER — DEXTROSE MONOHYDRATE 25 G/50ML
25 INJECTION, SOLUTION INTRAVENOUS
Status: DISCONTINUED | OUTPATIENT
Start: 2025-07-01 | End: 2025-07-08 | Stop reason: HOSPADM

## 2025-07-01 RX ORDER — ONDANSETRON 4 MG/1
4 TABLET, ORALLY DISINTEGRATING ORAL EVERY 6 HOURS PRN
Status: DISCONTINUED | OUTPATIENT
Start: 2025-07-01 | End: 2025-07-08 | Stop reason: HOSPADM

## 2025-07-01 RX ORDER — ALBUTEROL SULFATE 0.83 MG/ML
2.5 SOLUTION RESPIRATORY (INHALATION) EVERY 6 HOURS PRN
Status: DISCONTINUED | OUTPATIENT
Start: 2025-07-01 | End: 2025-07-08 | Stop reason: HOSPADM

## 2025-07-01 RX ORDER — SODIUM CHLORIDE 9 MG/ML
40 INJECTION, SOLUTION INTRAVENOUS AS NEEDED
Status: DISCONTINUED | OUTPATIENT
Start: 2025-07-01 | End: 2025-07-08 | Stop reason: HOSPADM

## 2025-07-01 RX ORDER — ACETAMINOPHEN 650 MG/1
650 SUPPOSITORY RECTAL EVERY 4 HOURS PRN
Status: DISCONTINUED | OUTPATIENT
Start: 2025-07-01 | End: 2025-07-08 | Stop reason: HOSPADM

## 2025-07-01 RX ORDER — POLYETHYLENE GLYCOL 3350 17 G/17G
17 POWDER, FOR SOLUTION ORAL DAILY
Status: DISCONTINUED | OUTPATIENT
Start: 2025-07-02 | End: 2025-07-08 | Stop reason: HOSPADM

## 2025-07-01 RX ORDER — AMOXICILLIN 250 MG
2 CAPSULE ORAL DAILY
Status: DISCONTINUED | OUTPATIENT
Start: 2025-07-02 | End: 2025-07-08 | Stop reason: HOSPADM

## 2025-07-01 RX ORDER — POLYETHYLENE GLYCOL 3350 17 G/17G
17 POWDER, FOR SOLUTION ORAL DAILY
COMMUNITY

## 2025-07-01 RX ORDER — OMEPRAZOLE 10 MG/1
10 CAPSULE, DELAYED RELEASE ORAL DAILY
COMMUNITY

## 2025-07-01 RX ORDER — GUAIFENESIN 200 MG/10ML
200 LIQUID ORAL 3 TIMES DAILY PRN
Status: DISCONTINUED | OUTPATIENT
Start: 2025-07-01 | End: 2025-07-08 | Stop reason: HOSPADM

## 2025-07-01 RX ORDER — NICOTINE POLACRILEX 4 MG
15 LOZENGE BUCCAL
Status: DISCONTINUED | OUTPATIENT
Start: 2025-07-01 | End: 2025-07-08 | Stop reason: HOSPADM

## 2025-07-01 RX ORDER — BUMETANIDE 1 MG/1
0.5 TABLET ORAL DAILY
Status: DISCONTINUED | OUTPATIENT
Start: 2025-07-02 | End: 2025-07-02

## 2025-07-01 RX ORDER — BISACODYL 5 MG/1
5 TABLET, DELAYED RELEASE ORAL DAILY PRN
Status: DISCONTINUED | OUTPATIENT
Start: 2025-07-01 | End: 2025-07-08 | Stop reason: HOSPADM

## 2025-07-01 RX ORDER — ONDANSETRON 2 MG/ML
4 INJECTION INTRAMUSCULAR; INTRAVENOUS EVERY 6 HOURS PRN
Status: DISCONTINUED | OUTPATIENT
Start: 2025-07-01 | End: 2025-07-08 | Stop reason: HOSPADM

## 2025-07-01 RX ORDER — LACTULOSE 10 G/15ML
20 SOLUTION ORAL 2 TIMES DAILY PRN
Status: DISCONTINUED | OUTPATIENT
Start: 2025-07-01 | End: 2025-07-08 | Stop reason: HOSPADM

## 2025-07-01 RX ORDER — SODIUM CHLORIDE 0.9 % (FLUSH) 0.9 %
10 SYRINGE (ML) INJECTION EVERY 12 HOURS SCHEDULED
Status: DISCONTINUED | OUTPATIENT
Start: 2025-07-01 | End: 2025-07-08 | Stop reason: HOSPADM

## 2025-07-01 RX ORDER — POTASSIUM CITRATE AND CITRIC ACID MONOHYDRATE 1100; 334 MG/5ML; MG/5ML
10 SOLUTION ORAL DAILY
COMMUNITY

## 2025-07-01 RX ORDER — ACETAMINOPHEN 160 MG/5ML
650 SOLUTION ORAL EVERY 4 HOURS PRN
Status: DISCONTINUED | OUTPATIENT
Start: 2025-07-01 | End: 2025-07-08 | Stop reason: HOSPADM

## 2025-07-01 RX ORDER — IBUPROFEN 600 MG/1
1 TABLET ORAL
Status: DISCONTINUED | OUTPATIENT
Start: 2025-07-01 | End: 2025-07-08 | Stop reason: HOSPADM

## 2025-07-01 RX ORDER — DEXTROSE MONOHYDRATE AND SODIUM CHLORIDE 5; .45 G/100ML; G/100ML
100 INJECTION, SOLUTION INTRAVENOUS CONTINUOUS
Status: ACTIVE | OUTPATIENT
Start: 2025-07-01 | End: 2025-07-02

## 2025-07-01 RX ORDER — ATENOLOL 25 MG/1
25 TABLET ORAL DAILY
Status: DISCONTINUED | OUTPATIENT
Start: 2025-07-02 | End: 2025-07-07

## 2025-07-01 RX ORDER — AMOXICILLIN 250 MG
2 CAPSULE ORAL 2 TIMES DAILY PRN
Status: DISCONTINUED | OUTPATIENT
Start: 2025-07-01 | End: 2025-07-08 | Stop reason: HOSPADM

## 2025-07-01 RX ORDER — MODAFINIL 100 MG/1
100 TABLET ORAL DAILY
COMMUNITY
End: 2025-07-08 | Stop reason: HOSPADM

## 2025-07-01 RX ORDER — ACETAMINOPHEN 325 MG/1
650 TABLET ORAL EVERY 4 HOURS PRN
Status: DISCONTINUED | OUTPATIENT
Start: 2025-07-01 | End: 2025-07-01 | Stop reason: SDUPTHER

## 2025-07-01 RX ORDER — BISACODYL 10 MG
10 SUPPOSITORY, RECTAL RECTAL AS NEEDED
Status: DISCONTINUED | OUTPATIENT
Start: 2025-07-01 | End: 2025-07-01 | Stop reason: SDUPTHER

## 2025-07-01 RX ORDER — MULTIVITAMIN WITH IRON
1000 TABLET ORAL DAILY
Status: DISCONTINUED | OUTPATIENT
Start: 2025-07-02 | End: 2025-07-08 | Stop reason: HOSPADM

## 2025-07-01 RX ORDER — LEVOTHYROXINE SODIUM 100 UG/1
100 TABLET ORAL DAILY
Status: DISCONTINUED | OUTPATIENT
Start: 2025-07-02 | End: 2025-07-08 | Stop reason: HOSPADM

## 2025-07-01 RX ORDER — MODAFINIL 100 MG/1
100 TABLET ORAL DAILY
Status: COMPLETED | OUTPATIENT
Start: 2025-07-02 | End: 2025-07-06

## 2025-07-01 RX ORDER — PANTOPRAZOLE SODIUM 40 MG/1
40 TABLET, DELAYED RELEASE ORAL
Status: DISCONTINUED | OUTPATIENT
Start: 2025-07-02 | End: 2025-07-08 | Stop reason: HOSPADM

## 2025-07-01 RX ORDER — ACETAMINOPHEN 325 MG/1
650 TABLET ORAL EVERY 4 HOURS PRN
Status: DISCONTINUED | OUTPATIENT
Start: 2025-07-01 | End: 2025-07-08 | Stop reason: HOSPADM

## 2025-07-01 RX ORDER — SODIUM CHLORIDE 0.9 % (FLUSH) 0.9 %
10 SYRINGE (ML) INJECTION AS NEEDED
Status: DISCONTINUED | OUTPATIENT
Start: 2025-07-01 | End: 2025-07-08 | Stop reason: HOSPADM

## 2025-07-01 RX ORDER — INSULIN LISPRO 100 [IU]/ML
2-7 INJECTION, SOLUTION INTRAVENOUS; SUBCUTANEOUS
Status: DISCONTINUED | OUTPATIENT
Start: 2025-07-01 | End: 2025-07-08 | Stop reason: HOSPADM

## 2025-07-01 RX ORDER — GUAIFENESIN 200 MG/10ML
200 LIQUID ORAL 3 TIMES DAILY PRN
COMMUNITY

## 2025-07-01 RX ORDER — BISACODYL 10 MG
10 SUPPOSITORY, RECTAL RECTAL DAILY PRN
Status: DISCONTINUED | OUTPATIENT
Start: 2025-07-01 | End: 2025-07-08 | Stop reason: HOSPADM

## 2025-07-01 RX ADMIN — CEFTRIAXONE SODIUM 2000 MG: 2 INJECTION, POWDER, FOR SOLUTION INTRAMUSCULAR; INTRAVENOUS at 15:38

## 2025-07-01 RX ADMIN — MEROPENEM 500 MG: 500 INJECTION, POWDER, FOR SOLUTION INTRAVENOUS at 21:32

## 2025-07-01 RX ADMIN — Medication 10 ML: at 21:33

## 2025-07-01 RX ADMIN — DEXTROSE AND SODIUM CHLORIDE 100 ML/HR: 5; 450 INJECTION, SOLUTION INTRAVENOUS at 22:27

## 2025-07-01 NOTE — PROGRESS NOTES
Clinical Pharmacy Services: Medication History    Madeleine Tavares is a 83 y.o. female presenting to [facility] for   Chief Complaint   Patient presents with    Altered Mental Status       She  has a past medical history of Abdominal hernia without obstruction and without gangrene, Altered mental state, Alzheimer disease, Anemia, Anxiety, Dementia, Depression, Diaphragmatic hernia, Difficulty walking, Diverticulitis large intestine w/o perforation or abscess w/o bleeding, ESBL (extended spectrum beta-lactamase) producing bacteria infection, Falls, Gastritis, GI bleed, Hypertension, Karli, Narcolepsy due to underlying condition without cataplexy, and UTI (urinary tract infection).    Allergies as of 07/01/2025 - Reviewed 07/01/2025   Allergen Reaction Noted    Levaquin [levofloxacin]  12/24/2016    Macrobid [nitrofurantoin monohyd macro]  12/24/2016    Penicillins  12/24/2016    Sulfa antibiotics  12/24/2016       Medication information was obtained from: Nursing Home   Pharmacy and Phone Number:     Prior to Admission Medications       Prescriptions Last Dose Informant Patient Reported? Taking?    acetaminophen (TYLENOL) 325 MG tablet  Nursing Home No Yes    Take 2 tablets by mouth Every 4 (Four) Hours As Needed for mild pain (1-3).    albuterol sulfate  (90 Base) MCG/ACT inhaler  Nursing Home Yes Yes    Inhale 2 puffs Every 6 (Six) Hours As Needed for Wheezing.    atenolol (TENORMIN) 25 MG tablet  Nursing Home Yes Yes    Take 1 tablet by mouth Daily.    bisacodyl (Dulcolax) 10 MG suppository  Nursing Home Yes Yes    Insert 1 suppository into the rectum As Needed for Constipation.    bumetanide (BUMEX) 0.5 MG tablet  Nursing Home Yes Yes    Take 1 tablet by mouth Daily.    citric acid-potassium citrate (POLYCITRA) 1100-334 MG/5ML solution  Nursing Home Yes Yes    Take 10 mL by mouth Daily.    guaifenesin (ROBITUSSIN) 100 MG/5ML liquid  Nursing Home Yes Yes    Take 10 mL by mouth 3 (Three) Times a  Day As Needed for Cough.    IRON PO  Self Yes Yes    Take 325 mg by mouth Daily.    lactulose (CHRONULAC) 10 GM/15ML solution  Nursing Home Yes Yes    Take 30 mL by mouth 2 (Two) Times a Day As Needed.    levothyroxine (SYNTHROID, LEVOTHROID) 75 MCG tablet  Self Yes Yes    Take 100 mcg by mouth Daily.    magnesium hydroxide (MILK OF MAGNESIA) 400 MG/5ML suspension  Nursing Home Yes Yes    Take 5 mL by mouth Daily As Needed.    modafinil (PROVIGIL) 100 MG tablet  Nursing Home Yes Yes    Take 1 tablet by mouth Daily.    omeprazole (prilOSEC) 10 MG capsule  Nursing Home Yes Yes    Take 1 capsule by mouth Daily.    polyethylene glycol (MIRALAX) 17 g packet  Nursing Home Yes Yes    Take 17 g by mouth Daily.    sennosides-docusate (senna-docusate sodium) 8.6-50 MG per tablet  Nursing Home Yes Yes    Take 2 tablets by mouth Daily.    vitamin B-12 (CYANOCOBALAMIN) 1000 MCG tablet  Nursing Home Yes Yes    Take 1 tablet by mouth Daily.    ALPRAZolam (XANAX) 0.25 MG tablet Not Taking  No No    Take 1 tablet by mouth Every 12 (Twelve) Hours As Needed for anxiety. John    Patient not taking:  Reported on 7/1/2025    aluminum-magnesium hydroxide-simethicone (MAALOX/MYLANTA) 200-200-20 MG/5ML suspension Not Taking  Yes No    Take 20 mL by mouth Every 6 (Six) Hours As Needed for Indigestion or Heartburn.    Patient not taking:  Reported on 7/1/2025    baclofen (LIORESAL) 10 MG tablet Not Taking  Yes No    Take 1 tablet by mouth 3 (Three) Times a Day.    Patient not taking:  Reported on 7/1/2025    benazepril (LOTENSIN) 5 MG tablet Not Taking  Yes No    Take 1 tablet by mouth Daily.    Patient not taking:  Reported on 7/1/2025    dextromethorphan-guaifenesin (ROBITUSSIN-DM)  MG/5ML syrup Not Taking  Yes No    Take 10 mL by mouth Every 6 (Six) Hours As Needed (cough).    Patient not taking:  Reported on 7/1/2025    dextrose (GLUTOSE) 40 % gel Not Taking  No No    Take 15 g by mouth As Needed for low blood sugar.    Patient  not taking:  Reported on 7/1/2025    donepezil (ARICEPT) 10 MG tablet Not Taking  Yes No    Take 1 tablet by mouth Every Night.    Patient not taking:  Reported on 7/1/2025    DULoxetine (CYMBALTA) 60 MG capsule Not Taking  Yes No    Take 1 capsule by mouth Daily.    Patient not taking:  Reported on 7/1/2025    FLUoxetine (PROzac) 10 MG capsule Not Taking  Yes No    Take 1 capsule by mouth Daily.    Patient not taking:  Reported on 7/1/2025    glucagon, human recombinant, (GLUCAGEN DIAGNOSTIC) 1 MG injection Not Taking  No No    Inject 1 mg under the skin 1 (One) Time As Needed (hypoglycemia) for up to 1 dose.    Patient not taking:  Reported on 7/1/2025    insulin aspart (novoLOG) 100 UNIT/ML injection Not Taking  No No    Inject 0-7 Units under the skin 4 (Four) Times a Day Before Meals & at Bedtime.    Patient not taking:  Reported on 7/1/2025    Magnesium Lactate (MAG-TAB SR PO) Not Taking  Yes No    Take 250 mg/day by mouth.    Patient not taking:  Reported on 7/1/2025    ondansetron (ZOFRAN) 4 MG tablet Not Taking  Yes No    Take 4 mg by mouth Every 8 (Eight) Hours As Needed for Nausea or Vomiting.    Patient not taking:  Reported on 7/1/2025    oxyCODONE-acetaminophen (PERCOCET)  MG per tablet Not Taking  No No    Take 1 tablet by mouth Every 4 (Four) Hours. Makinen    Patient not taking:  Reported on 7/1/2025    potassium citrate (UROCIT-K) 10 MEQ (1080 MG) CR tablet Not Taking  Yes No    Take 1 tablet by mouth 3 (Three) Times a Day With Meals.    Patient not taking:  Reported on 7/1/2025    promethazine (PHENERGAN) 25 MG tablet Not Taking  Yes No    Take 25 mg by mouth Every 6 (Six) Hours As Needed for nausea or vomiting.    Patient not taking:  Reported on 7/1/2025    rivaroxaban (XARELTO) 15 MG tablet Not Taking  Yes No    Take 15 mg by mouth Daily.    Patient not taking:  Reported on 7/1/2025    sucralfate (CARAFATE) 1 g tablet Not Taking  Yes No    Take 1 g by mouth 4 (Four) Times a Day.     Patient not taking:  Reported on 7/1/2025              Medication notes:     This medication list is complete to the best of my knowledge as of 7/1/2025    Please call if questions.    Scott Campos  Medication History Technician  [phone]    7/1/2025 15:44 EDT

## 2025-07-01 NOTE — ED NOTES
"Nursing report ED to floor  Madeleine Tavares  83 y.o.  female    HPI :  HPI  Stated Reason for Visit: altered mental stastus  History Obtained From: EMS  Duration (Hours): 2    Chief Complaint  Chief Complaint   Patient presents with    Altered Mental Status       Admitting doctor:   Edwar Rdz MD    Admitting diagnosis:   The primary encounter diagnosis was Acute cystitis without hematuria. Diagnoses of Altered mental status, unspecified altered mental status type and Abnormal head CT were also pertinent to this visit.    Code status:   Current Code Status       Date Active Code Status Order ID Comments User Context       Prior            Allergies:   Levaquin [levofloxacin], Macrobid [nitrofurantoin monohyd macro], Penicillins, and Sulfa antibiotics    Isolation:   No active isolations    Intake and Output  No intake or output data in the 24 hours ending 07/01/25 1613    Weight:       07/01/25  1407   Weight: 102 kg (224 lb 13.9 oz)       Most recent vitals:   Vitals:    07/01/25 1400 07/01/25 1401 07/01/25 1407 07/01/25 1500   BP: 138/90   132/66   Pulse: 59 55  (!) 45   Resp:       Temp:       TempSrc:       SpO2:  99%     Weight:   102 kg (224 lb 13.9 oz)    Height:   175.3 cm (69\")        Active LDAs/IV Access:   Lines, Drains & Airways       Active LDAs       Name Placement date Placement time Site Days    Peripheral IV 07/01/25 1318 20 G Anterior;Right Forearm 07/01/25  1318  Forearm  less than 1                    Labs (abnormal labs have a star):   Labs Reviewed   COMPREHENSIVE METABOLIC PANEL - Abnormal; Notable for the following components:       Result Value    Creatinine 1.05 (*)     eGFR 52.8 (*)     All other components within normal limits    Narrative:     GFR Categories in Chronic Kidney Disease (CKD)              GFR Category          GFR (mL/min/1.73)    Interpretation  G1                    90 or greater        Normal or high (1)  G2                    60-89                Mild decrease " (1)  G3a                   45-59                Mild to moderate decrease  G3b                   30-44                Moderate to severe decrease  G4                    15-29                Severe decrease  G5                    14 or less           Kidney failure    (1)In the absence of evidence of kidney disease, neither GFR category G1 or G2 fulfill the criteria for CKD.    eGFR calculation 2021 CKD-EPI creatinine equation, which does not include race as a factor   URINALYSIS W/ CULTURE IF INDICATED - Abnormal; Notable for the following components:    Appearance, UA Turbid (*)     pH, UA 8.5 (*)     Protein, UA 30 mg/dL (1+) (*)     Leuk Esterase, UA Large (3+) (*)     Nitrite, UA Positive (*)     All other components within normal limits    Narrative:     In absence of clinical symptoms, the presence of pyuria, bacteria, and/or nitrites on the urinalysis result does not correlate with infection.   CBC WITH AUTO DIFFERENTIAL - Abnormal; Notable for the following components:    MCHC 31.1 (*)     RDW 12.0 (*)     All other components within normal limits   TROPONIN - Abnormal; Notable for the following components:    HS Troponin T 15 (*)     All other components within normal limits    Narrative:     High Sensitive Troponin T Reference Range:  <14.0 ng/L- Negative Female for AMI  <22.0 ng/L- Negative Male for AMI  >=14 - Abnormal Female indicating possible myocardial injury.  >=22 - Abnormal Male indicating possible myocardial injury.   Clinicians would have to utilize clinical acumen, EKG, Troponin, and serial changes to determine if it is an Acute Myocardial Infarction or myocardial injury due to an underlying chronic condition.        URINALYSIS, MICROSCOPIC ONLY - Abnormal; Notable for the following components:    WBC, UA Too Numerous to Count (*)     Bacteria, UA 4+ (*)     Squamous Epithelial Cells, UA 3-6 (*)     All other components within normal limits   URINE CULTURE   HIGH SENSITIVITIY TROPONIN T 1HR    CBC AND DIFFERENTIAL    Narrative:     The following orders were created for panel order CBC & Differential.  Procedure                               Abnormality         Status                     ---------                               -----------         ------                     CBC Auto Differential[[id number]]        Abnormal            Final result                 Please view results for these tests on the individual orders.       EKG:   ECG 12 Lead Altered Mental Status   Preliminary Result   HEART RATE=47  bpm   RR Pyskcvwc=4966  ms   ND Lkdghlmk=002  ms   P Horizontal Axis=  deg   P Front Axis=5  deg   QRSD Interval=88  ms   QT Gyxyjlbk=770  ms   BBtA=255  ms   QRS Axis=59  deg   T Wave Axis=56  deg   - BORDERLINE ECG -   Sinus bradycardia   Borderline  prolonged ND interval   Consider  RVH or posterior infarct   When compared with ECG of 26-May-2021 11:47:14,   No significant change   Date and Time of Study:2025-07-01 13:50:14          Meds given in ED:   Medications   nitroglycerin (NITROSTAT) SL tablet 0.4 mg (has no administration in time range)   nitroglycerin (NITROSTAT) SL tablet 0.4 mg (has no administration in time range)   cefTRIAXone (ROCEPHIN) 2,000 mg in sodium chloride 0.9 % 100 mL MBP (2,000 mg Intravenous New Bag 7/1/25 1538)       Imaging results:  CT Head Without Contrast  Result Date: 7/1/2025  1. No convincing acute intracranial abnormality is identified.  2. There is mild-to-moderate small vessel disease in the cerebral white matter.  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 There is diffuse cerebral atrophy and the lateral and third ventricles are prominent in size, felt to be on the basis of central volume loss or atrophy, and this patient small vessel  disease and atrophy has both worsened since 2021. There is a right eye scleral band in place and mild increased density in the right globe when compared to the left, and correlation with ophthalmologic exam is suggested.  The remainder of the head CT is normal. If there remains any clinical suspicion of an acute stroke causing mental status change and increasing confusion, an MRI of the brain could be obtained for a more complete assessment.  Radiation dose reduction techniques were utilized, including automated exposure control and exposure modulation based on body size.       XR Chest 1 View  Result Date: 2025  1. Small linear band of increased density in the left midlung could be artifact. 2. No definite focal infiltrates are seen per 3. Hiatal hernia.   This report was finalized on 2025 2:42 PM by Dr. Jose Newsome M.D on Workstation: [employer]        Ambulatory status:   - bed bound.    Social issues:   Social History     Socioeconomic History    Marital status:    Tobacco Use    Smoking status: Former     Current packs/day: 0.00     Average packs/day: 1 pack/day for 15.0 years (15.0 ttl pk-yrs)     Types: Cigarettes     Start date:      Quit date:      Years since quittin.5    Tobacco comments:     EX-SMOKER   Substance and Sexual Activity    Alcohol use: No    Drug use: No    Sexual activity: Not Currently     Partners: Male       Peripheral Neurovascular  Peripheral Neurovascular (Adult)  Peripheral Neurovascular WDL: WDL    Neuro Cognitive  Neuro Cognitive (Adult)  Cognitive/Neuro/Behavioral WDL: .WDL except, orientation, mood/behavior  Orientation: disoriented x 4  Mood/Behavior: agitated, restless, uncooperative    Learning  Learning Assessment  Learning Readiness and Ability: cognitive limitation noted, sensory deficit noted    Respiratory  Respiratory WDL  Respiratory WDL: WDL    Abdominal Pain  Safety Interventions  Safety Precautions/Falls Reduction: assistive  device/personal items within reach, fall reduction program maintained, nonskid shoes/slippers when out of bed    Pain Assessments  Pain (Adult)  (0-10) Pain Rating: Rest: 0  (0-10) Pain Rating: Activity: 0    NIH Stroke Scale       Nata Anthony RN  07/01/25 16:13 EDT

## 2025-07-01 NOTE — CASE MANAGEMENT/SOCIAL WORK
Discharge Planning Assessment  [facility]     Patient Name: Madeleine Tavares  MRN: [id number]  Today's Date: 7/1/2025    Admit Date: 7/1/2025        Discharge Needs Assessment    No documentation.                  Discharge Plan       Row Name 07/01/25 1517       Plan    Plan Comments Patient with legal guardian on file and is a alvarez of the [facility]. Two voicemails have been left with both the general Guardianship Office and the guardian herself, Mellissa Linares, but we have not received a call back. There is no paperwork scanned into Epic. [facility] is present upon chart review. Lara CALHOUN RN CCP manager udpated.                  Continued Care and Services - Admitted Since 7/1/2025    No active coordination exists.          Demographic Summary    No documentation.                  Functional Status    No documentation.                  Psychosocial    No documentation.                  Abuse/Neglect    No documentation.                  Legal    No documentation.                  Substance Abuse    No documentation.                  Patient Forms    No documentation.                     Marco A Anderson RN

## 2025-07-01 NOTE — ED NOTES
Pt arrive to ER by EMS from [facility], c/o of altered mental x 2 hours, per facility pt has hx of dysphagia and dementia. Pt is not normally on O2.

## 2025-07-01 NOTE — CASE MANAGEMENT/SOCIAL WORK
Discharge Planning Assessment  [facility]     Patient Name: Madeleine Tavares  MRN: [id number]  Today's Date: 7/1/2025    Admit Date: 7/1/2025        Discharge Needs Assessment    No documentation.                  Discharge Plan       Row Name 07/01/25 3853       Plan    Plan Comments Received call back from Mellissa Linares, legal guardian, who gives general consent to treat. Informed Ms. Linares that patient is being admitted, and she silvio make note of this. Paperwork requested.      Row Name 07/01/25 4119       Plan    Plan Comments Patient with legal guardian on file and is a alvarez of the [facility]. Two voicemails have been left with both the general Guardianship Office and the guardian herself, Mellissa Linares, but we have not received a call back. There is no paperwork scanned into Epic. [facility] is present upon chart review. Lara CALHOUN RN CCP manager udpated.                  Continued Care and Services - Admitted Since 7/1/2025    No active coordination exists.          Demographic Summary    No documentation.                  Functional Status    No documentation.                  Psychosocial    No documentation.                  Abuse/Neglect    No documentation.                  Legal    No documentation.                  Substance Abuse    No documentation.                  Patient Forms    No documentation.                     Marco A Anderson RN

## 2025-07-01 NOTE — ED PROVIDER NOTES
EMERGENCY DEPARTMENT ENCOUNTER  Room Number:  21/21  PCP: Terese Song MD  Independent Historians: EMS      HPI:  Chief Complaint: had concerns including Altered Mental Status.   A complete HPI/ROS/PMH/PSH/SH/FH are unobtainable due to: Altered Mental Status    Context: Madeleine Tavares is a 83 y.o. female with a medical history of anemia, diabetes, hypersomnia, narcolepsy who presents to the ED c/o acute altered mental status.  EMS reports they were called for altered mental status for last 2 hours.  The patient's blood sugar was found to be 50 and she was given glucose.  She then went back to her normal mental status baseline.  They report she is not typically on oxygen however they could not get a pulse ox reading on her so they put her on oxygen.  The patient is not able to provide any history.  Her baseline is that she typically sings.      Review of prior external notes (non-ED) -and- Review of prior external test results outside of this encounter: Laboratory evaluation 9/1/2024 shows normal CBC    Prescription drug monitoring program review:         PAST MEDICAL HISTORY  Active Ambulatory Problems     Diagnosis Date Noted    Possible Cerebrovascular accident (CVA) 12/25/2016    Anemia, chronic disease 10/17/2012    Chest pain on breathing 09/05/2013    History of gastrointestinal hemorrhage 09/05/2013    History of deep venous thrombosis 09/05/2013    Hypercoagulable state 10/17/2012    Labile hypertension 10/17/2012    Hypersomnia 10/16/1991    Iron deficiency anemia 09/11/2013    Adiposity 10/17/2012    Osteoarthritis 10/17/2012    Transient alteration of awareness 12/25/2016    Toxic metabolic encephalopathy multifactorial: volume dep., central acting drugs/UTI recurrent 12/25/2016    Chronic tension-type headache, not intractable 12/25/2016    Generalized weakness 12/25/2016    Muscle twitching 12/25/2016    Chronic kidney disease, stage II (mild) 12/25/2016    Alzheimer's disease 12/25/2016     Diverticulosis of large intestine 2016    Primary narcolepsy without cataplexy 2016    Manic depressive disorder 2016    Decreased mobility 2016    Anxiety and depression 2016    UTI due to extended-spectrum beta lactamase (ESBL) producing Escherichia coli recurrent 2016    Risk for falls 2016     Resolved Ambulatory Problems     Diagnosis Date Noted    No Resolved Ambulatory Problems     Past Medical History:   Diagnosis Date    Abdominal hernia without obstruction and without gangrene     Altered mental state     Alzheimer disease     Anemia     Anxiety     Dementia     Depression     Diaphragmatic hernia     Difficulty walking     Diverticulitis large intestine w/o perforation or abscess w/o bleeding     ESBL (extended spectrum beta-lactamase) producing bacteria infection     Falls     Gastritis     GI bleed     Hypertension     Karli     Narcolepsy due to underlying condition without cataplexy     UTI (urinary tract infection)          PAST SURGICAL HISTORY  Past Surgical History:   Procedure Laterality Date    CHOLECYSTECTOMY  08/15/2008    CYSTOSCOPY      multiple    HEMORROIDECTOMY      LAPAROSCOPIC HYSTERECTOMY  2000    VENA CAVA FILTER INSERTION  2012         FAMILY HISTORY  Family History   Problem Relation Age of Onset    Diabetes Other     Coronary artery disease Other          SOCIAL HISTORY  Social History     Socioeconomic History    Marital status:    Tobacco Use    Smoking status: Former     Current packs/day: 0.00     Average packs/day: 1 pack/day for 15.0 years (15.0 ttl pk-yrs)     Types: Cigarettes     Start date:      Quit date:      Years since quittin.5    Tobacco comments:     EX-SMOKER   Substance and Sexual Activity    Alcohol use: No    Drug use: No    Sexual activity: Not Currently     Partners: Male       Chronic or social conditions impacting patient care (Social Determinants of Health):  Social Drivers of Health      Tobacco Use: Medium Risk (7/1/2025)    Patient History     Smoking Tobacco Use: Former     Smokeless Tobacco Use: Unknown     Passive Exposure: Not on file   Alcohol Use: Not on file   Financial Resource Strain: Not on file   Food Insecurity: Not on file   Transportation Needs: Not on file   Physical Activity: Not on file   Stress: Not on file   Social Connections: Unknown (10/9/2023)    Family and Community Support     Help with Day-to-Day Activities: Not on file     Lonely or Isolated: Not on file   Interpersonal Safety: Unknown (7/1/2025)    Abuse Screen     Unsafe at Home or Work/School: other (see comments)     Feels Threatened by Someone?: other (see comments)     Does Anyone Keep You from Contacting Others or Doint Things Outside the Home?: other (see comments)     Physical Sign of Abuse Present: patient unable to answer   Depression: Not on file   Housing Stability: Unknown (10/9/2023)    Housing Stability     Current Living Arrangements: Not on file     Potentially Unsafe Housing Conditions: Not on file   Utilities: Not on file   Health Literacy: Unknown (10/9/2023)    Education     Help with school or training?: Not on file     Preferred Language: Not on file   Employment: Unknown (10/9/2023)    Employment     Do you want help finding or keeping work or a job?: Not on file   Disabilities: Unknown (10/9/2023)    Disabilities     Concentrating, Remembering, or Making Decisions Difficulty: Not on file     Doing Errands Independently Difficulty: Not on file       ALLERGIES  Levaquin [levofloxacin], Macrobid [nitrofurantoin monohyd macro], Penicillins, and Sulfa antibiotics      REVIEW OF SYSTEMS  Review of Systems  Included in HPI  All systems reviewed and negative except for those discussed in HPI.      PHYSICAL EXAM    I have reviewed the triage vital signs and nursing notes.    ED Triage Vitals   Temp Heart Rate Resp BP SpO2   07/01/25 1324 07/01/25 1323 07/01/25 1323 07/01/25 1323 07/01/25 1323   96.4  °F (35.8 °C) 51 20 131/57 92 %      Temp src Heart Rate Source Patient Position BP Location FiO2 (%)   07/01/25 1324 07/01/25 1323 -- -- --   Axillary Monitor          Physical Exam  GENERAL: Awake, alert, no acute distress, singing  SKIN: Warm, dry  HENT: Normocephalic, atraumatic  EYES: no scleral icterus  CV: regular rhythm, regular rate  RESPIRATORY: normal effort, lungs clear  ABDOMEN: soft, nontender, nondistended  MUSCULOSKELETAL: no deformity  NEURO: alert, moves all extremities, follows commands            LAB RESULTS  Recent Results (from the past 24 hours)   ECG 12 Lead Altered Mental Status    Collection Time: 07/01/25  1:50 PM   Result Value Ref Range    QT Interval 467 ms    QTC Interval 413 ms   Urinalysis With Culture If Indicated - Urine, Catheter    Collection Time: 07/01/25  2:02 PM    Specimen: Urine, Catheter   Result Value Ref Range    Color, UA Yellow Yellow, Straw    Appearance, UA Turbid (A) Clear    pH, UA 8.5 (H) 5.0 - 8.0    Specific Gravity, UA 1.013 1.005 - 1.030    Glucose, UA Negative Negative    Ketones, UA Negative Negative    Bilirubin, UA Negative Negative    Blood, UA Negative Negative    Protein, UA 30 mg/dL (1+) (A) Negative    Leuk Esterase, UA Large (3+) (A) Negative    Nitrite, UA Positive (A) Negative    Urobilinogen, UA 1.0 E.U./dL 0.2 - 1.0 E.U./dL   Urinalysis, Microscopic Only - Urine, Catheter    Collection Time: 07/01/25  2:02 PM    Specimen: Urine, Catheter   Result Value Ref Range    RBC, UA 0-2 None Seen, 0-2 /HPF    WBC, UA Too Numerous to Count (A) None Seen, 0-2 /HPF    Bacteria, UA 4+ (A) None Seen /HPF    Squamous Epithelial Cells, UA 3-6 (A) None Seen, 0-2 /HPF    Hyaline Casts, UA 0-2 None Seen /LPF    Granular Casts, UA Present None Seen /LPF    Triple Phosphate Crystals, UA Moderate/2+ None Seen /HPF    WBC Clumps, UA Present None Seen /HPF    Methodology Manual Light Microscopy    Comprehensive Metabolic Panel    Collection Time: 07/01/25  2:14 PM     Specimen: Arm, Left; Blood   Result Value Ref Range    Glucose 99 65 - 99 mg/dL    BUN 13.0 8.0 - 23.0 mg/dL    Creatinine 1.05 (H) 0.57 - 1.00 mg/dL    Sodium 139 136 - 145 mmol/L    Potassium 4.7 3.5 - 5.2 mmol/L    Chloride 104 98 - 107 mmol/L    CO2 25.0 22.0 - 29.0 mmol/L    Calcium 9.7 8.6 - 10.5 mg/dL    Total Protein 7.7 6.0 - 8.5 g/dL    Albumin 3.5 3.5 - 5.2 g/dL    ALT (SGPT) 6 1 - 33 U/L    AST (SGOT) 18 1 - 32 U/L    Alkaline Phosphatase 72 39 - 117 U/L    Total Bilirubin 0.2 0.0 - 1.2 mg/dL    Globulin 4.2 gm/dL    A/G Ratio 0.8 g/dL    BUN/Creatinine Ratio 12.4 7.0 - 25.0    Anion Gap 10.0 5.0 - 15.0 mmol/L    eGFR 52.8 (L) >60.0 mL/min/1.73   CBC Auto Differential    Collection Time: 07/01/25  2:14 PM    Specimen: Arm, Left; Blood   Result Value Ref Range    WBC 5.76 3.40 - 10.80 10*3/mm3    RBC 4.46 3.77 - 5.28 10*6/mm3    Hemoglobin 12.2 12.0 - 15.9 g/dL    Hematocrit 39.2 34.0 - 46.6 %    MCV 87.9 79.0 - 97.0 fL    MCH 27.4 26.6 - 33.0 pg    MCHC 31.1 (L) 31.5 - 35.7 g/dL    RDW 12.0 (L) 12.3 - 15.4 %    RDW-SD 38.7 37.0 - 54.0 fl    MPV 10.0 6.0 - 12.0 fL    Platelets 381 140 - 450 10*3/mm3    Neutrophil % 45.9 42.7 - 76.0 %    Lymphocyte % 39.6 19.6 - 45.3 %    Monocyte % 9.9 5.0 - 12.0 %    Eosinophil % 3.8 0.3 - 6.2 %    Basophil % 0.5 0.0 - 1.5 %    Immature Grans % 0.3 0.0 - 0.5 %    Neutrophils, Absolute 2.64 1.70 - 7.00 10*3/mm3    Lymphocytes, Absolute 2.28 0.70 - 3.10 10*3/mm3    Monocytes, Absolute 0.57 0.10 - 0.90 10*3/mm3    Eosinophils, Absolute 0.22 0.00 - 0.40 10*3/mm3    Basophils, Absolute 0.03 0.00 - 0.20 10*3/mm3    Immature Grans, Absolute 0.02 0.00 - 0.05 10*3/mm3    nRBC 0.0 0.0 - 0.2 /100 WBC   High Sensitivity Troponin T    Collection Time: 07/01/25  2:14 PM    Specimen: Arm, Left; Blood   Result Value Ref Range    HS Troponin T 15 (H) <14 ng/L         RADIOLOGY  XR Chest 1 View  Result Date: 7/1/2025  XR CHEST 1 VW-7/1/2025  HISTORY: Altered mental status.  The heart size  is within normal limits. Lungs are slightly underinflated. There is a small bandlike area of increased density projecting over the left upper lung but this could be artifact. No definite focal infiltrates are seen.  Hiatal hernia is seen.      1. Small linear band of increased density in the left midlung could be artifact. 2. No definite focal infiltrates are seen per 3. Hiatal hernia.   This report was finalized on 7/1/2025 2:42 PM by Dr. Jose Newsome M.D on Workstation: [id number]          MEDICATIONS GIVEN IN ER  Medications   cefTRIAXone (ROCEPHIN) 2,000 mg in sodium chloride 0.9 % 100 mL MBP (has no administration in time range)   nitroglycerin (NITROSTAT) SL tablet 0.4 mg (has no administration in time range)   nitroglycerin (NITROSTAT) SL tablet 0.4 mg (has no administration in time range)         ORDERS PLACED DURING THIS VISIT:  Orders Placed This Encounter   Procedures    Urine Culture - Urine,    XR Chest 1 View    CT Head Without Contrast    Comprehensive Metabolic Panel    Urinalysis With Culture If Indicated - Urine, Catheter    CBC Auto Differential    High Sensitivity Troponin T    Urinalysis, Microscopic Only - Urine, Clean Catch    High Sensitivity Troponin T 1Hr    Telemetry - Place Orders & Notify Provider of Results When Patient Experiences Acute Chest Pain, Dysrhythmia or Respiratory Distress    May Be Off Telemetry for Tests    Maintain IV Access    Telemetry - Place Orders & Notify Provider of Results When Patient Experiences Acute Chest Pain, Dysrhythmia or Respiratory Distress    May Be Off Telemetry for Tests    [facility] (on-call MD unless specified) Details    ECG 12 Lead Altered Mental Status    Initiate Observation Status    CBC & Differential         OUTPATIENT MEDICATION MANAGEMENT:  Current Facility-Administered Medications Ordered in Epic   Medication Dose Route Frequency Provider Last Rate Last Admin    cefTRIAXone (ROCEPHIN) 2,000 mg in sodium chloride 0.9 % 100 mL MBP  2,000 mg  Intravenous Once Darin Davison MD        nitroglycerin (NITROSTAT) SL tablet 0.4 mg  0.4 mg Sublingual Q5 Min PRN Edwar Rdz MD        nitroglycerin (NITROSTAT) SL tablet 0.4 mg  0.4 mg Sublingual Q5 Min PRN Edwar Rdz MD         Current Outpatient Medications Ordered in Epic   Medication Sig Dispense Refill    acetaminophen (TYLENOL) 325 MG tablet Take 2 tablets by mouth Every 4 (Four) Hours As Needed for mild pain (1-3).  0    ALPRAZolam (XANAX) 0.25 MG tablet Take 1 tablet by mouth Every 12 (Twelve) Hours As Needed for anxiety. [place] 60 tablet 0    aluminum-magnesium hydroxide-simethicone (MAALOX/MYLANTA) 200-200-20 MG/5ML suspension Take 20 mL by mouth Every 6 (Six) Hours As Needed for Indigestion or Heartburn.      atenolol (TENORMIN) 25 MG tablet Take 25 mg by mouth Daily.      baclofen (LIORESAL) 10 MG tablet Take 10 mg by mouth 3 (Three) Times a Day.      benazepril (LOTENSIN) 5 MG tablet Take 5 mg by mouth Daily.      bumetanide (BUMEX) 1 MG tablet Take 1 mg by mouth Daily.      dextromethorphan-guaifenesin (ROBITUSSIN-DM)  MG/5ML syrup Take 10 mL by mouth Every 6 (Six) Hours As Needed (cough).      dextrose (GLUTOSE) 40 % gel Take 15 g by mouth As Needed for low blood sugar.      docusate sodium 100 MG capsule Take 100 mg by mouth 2 (Two) Times a Day.  0    donepezil (ARICEPT) 10 MG tablet Take 10 mg by mouth Every Night.      DULoxetine (CYMBALTA) 60 MG capsule Take 60 mg by mouth Daily.      FLUoxetine (PROzac) 10 MG capsule Take 10 mg by mouth Daily.      glucagon, human recombinant, (GLUCAGEN DIAGNOSTIC) 1 MG injection Inject 1 mg under the skin 1 (One) Time As Needed (hypoglycemia) for up to 1 dose.      insulin aspart (novoLOG) 100 UNIT/ML injection Inject 0-7 Units under the skin 4 (Four) Times a Day Before Meals & at Bedtime.  12    IRON PO Take 325 mg by mouth.      levothyroxine (SYNTHROID, LEVOTHROID) 100 MCG tablet Take 100 mcg by mouth Daily.      magnesium hydroxide (MILK OF  MAGNESIA) 2400 MG/10ML suspension suspension Take 10 mL by mouth Daily As Needed.      Magnesium Lactate (MAG-TAB SR PO) Take 250 mg/day by mouth.      nitroglycerin (NITROSTAT) 0.4 MG SL tablet 1 SL q 5 minutes if BP >100 systolically. Max 3 doses in 15 minutes. 100 tablet 12    ondansetron (ZOFRAN) 4 MG tablet Take 4 mg by mouth Every 8 (Eight) Hours As Needed for Nausea or Vomiting.      oxyCODONE-acetaminophen (PERCOCET)  MG per tablet Take 1 tablet by mouth Every 4 (Four) Hours. [place] 180 tablet 0    potassium citrate (UROCIT-K) 10 MEQ (1080 MG) CR tablet Take 10 mEq by mouth 3 (Three) Times a Day With Meals.      promethazine (PHENERGAN) 25 MG tablet Take 25 mg by mouth Every 6 (Six) Hours As Needed for nausea or vomiting.      rivaroxaban (XARELTO) 15 MG tablet Take 15 mg by mouth Daily.      sucralfate (CARAFATE) 1 g tablet Take 1 g by mouth 4 (Four) Times a Day.      vitamin B-12 (CYANOCOBALAMIN) 100 MCG tablet Take 50 mcg by mouth Daily.           PROCEDURES  Procedures            PROGRESS, DATA ANALYSIS, CONSULTS, AND MEDICAL DECISION MAKING  All labs have been independently interpreted by me.  All radiology studies have been reviewed by me. All EKG's have been independently viewed and interpreted by me.  Discussion below represents my analysis of pertinent findings related to patient's condition, differential diagnosis, treatment plan and final disposition.    Differential diagnosis includes but is not limited to stroke, UTI, pneumonia, sepsis, renal failure, hyperglycemia, DKA, intracranial hemorrhage.    Clinical Scores:                                       ED Course as of 07/01/25 1535   Tue Jul 01, 2025   1358 EKG PROCEDURE    EKG time: 1350  Rhythm/Rate: Sinus bradycardia, rate 47  P waves and LA: Normal P, prolonged LA  QRS, axis: Narrow QRS, normal axis  ST and T waves: No acute    Independently Interpreted by me  Slower rate changed compared to prior 12/27/2016   [TR]   1415 Nitrite,  UA(!): Positive [TR]   1415 Leukocytes, UA(!): Large (3+) [TR]   1432 WBC, UA(!): Too Numerous to Count [TR]   1432 Bacteria, UA(!): 4+ [TR]   1449 Creatinine(!): 1.05 [TR]   1449 HS Troponin T(!): 15 [TR]   1449 XR Chest 1 View  My independent interpretation of the imaging study is no pneumothorax [TR]   1454 Hemoglobin: 12.2 [TR]   1519 Discussing with Dr. Mahoney with neuroradiology.  CT of the head shows no acute process but she does have 2 strokes that are new since prior exam. [TR]   1534 Discussing with Dr. Rdz with [facility].  He agrees to admit. [TR]      ED Course User Index  [TR] Darin Davison MD             AS OF 15:35 EDT VITALS:    BP - 132/66  HR - (!) 45  TEMP - 96.4 °F (35.8 °C) (Axillary)  O2 SATS - 99%    COMPLEXITY OF CARE  The patient requires admission.      DIAGNOSIS  Final diagnoses:   Acute cystitis without hematuria   Altered mental status, unspecified altered mental status type   Abnormal head CT         DISPOSITION  ED Disposition       ED Disposition   Decision to Admit    Condition   --    Comment   Level of Care: Telemetry [5]   Diagnosis: UTI (urinary tract infection) [860358]   Admitting Physician: VINH RDZ [[id number]]   Attending Physician: VINH RDZ [[id number]]   Is patient appropriate for Inpatient Observation Unit?: No [0]                  Please note that portions of this document were completed with a voice recognition program.    Note Disclaimer: At [facility], we believe that sharing information builds trust and better relationships. You are receiving this note because you recently visited [facility]. It is possible you will see health information before a provider has talked with you about it. This kind of information can be easy to misunderstand. To help you fully understand what it means for your health, we urge you to discuss this note with your provider.         Darin Davison MD  07/01/25 5817

## 2025-07-02 PROBLEM — G93.41 METABOLIC ENCEPHALOPATHY: Status: ACTIVE | Noted: 2025-07-01

## 2025-07-02 PROBLEM — I10 ESSENTIAL HYPERTENSION: Status: ACTIVE | Noted: 2025-07-02

## 2025-07-02 PROBLEM — N30.00 ACUTE CYSTITIS WITHOUT HEMATURIA: Status: ACTIVE | Noted: 2025-07-01

## 2025-07-02 LAB
ALBUMIN SERPL-MCNC: 3.1 G/DL (ref 3.5–5.2)
ALBUMIN/GLOB SERPL: 0.8 G/DL
ALP SERPL-CCNC: 66 U/L (ref 39–117)
ALT SERPL W P-5'-P-CCNC: 10 U/L (ref 1–33)
ANION GAP SERPL CALCULATED.3IONS-SCNC: 7.9 MMOL/L (ref 5–15)
AST SERPL-CCNC: 16 U/L (ref 1–32)
BASOPHILS # BLD AUTO: 0.03 10*3/MM3 (ref 0–0.2)
BASOPHILS NFR BLD AUTO: 0.6 % (ref 0–1.5)
BILIRUB SERPL-MCNC: <0.2 MG/DL (ref 0–1.2)
BUN SERPL-MCNC: 12 MG/DL (ref 8–23)
BUN/CREAT SERPL: 11.9 (ref 7–25)
CALCIUM SPEC-SCNC: 9.3 MG/DL (ref 8.6–10.5)
CHLORIDE SERPL-SCNC: 107 MMOL/L (ref 98–107)
CO2 SERPL-SCNC: 22.1 MMOL/L (ref 22–29)
CREAT SERPL-MCNC: 1.01 MG/DL (ref 0.57–1)
DEPRECATED RDW RBC AUTO: 39.4 FL (ref 37–54)
EGFRCR SERPLBLD CKD-EPI 2021: 55 ML/MIN/1.73
EOSINOPHIL # BLD AUTO: 0.21 10*3/MM3 (ref 0–0.4)
EOSINOPHIL NFR BLD AUTO: 3.9 % (ref 0.3–6.2)
ERYTHROCYTE [DISTWIDTH] IN BLOOD BY AUTOMATED COUNT: 12.4 % (ref 12.3–15.4)
GLOBULIN UR ELPH-MCNC: 3.7 GM/DL
GLUCOSE BLDC GLUCOMTR-MCNC: 103 MG/DL (ref 70–130)
GLUCOSE BLDC GLUCOMTR-MCNC: 114 MG/DL (ref 70–130)
GLUCOSE BLDC GLUCOMTR-MCNC: 132 MG/DL (ref 70–130)
GLUCOSE BLDC GLUCOMTR-MCNC: 172 MG/DL (ref 70–130)
GLUCOSE BLDC GLUCOMTR-MCNC: 26 MG/DL (ref 70–130)
GLUCOSE BLDC GLUCOMTR-MCNC: 29 MG/DL (ref 70–130)
GLUCOSE BLDC GLUCOMTR-MCNC: 39 MG/DL (ref 70–130)
GLUCOSE BLDC GLUCOMTR-MCNC: 43 MG/DL (ref 70–130)
GLUCOSE BLDC GLUCOMTR-MCNC: 52 MG/DL (ref 70–130)
GLUCOSE BLDC GLUCOMTR-MCNC: 61 MG/DL (ref 70–130)
GLUCOSE BLDC GLUCOMTR-MCNC: 68 MG/DL (ref 70–130)
GLUCOSE SERPL-MCNC: 112 MG/DL (ref 65–99)
HBA1C MFR BLD: 5.9 % (ref 4.8–5.6)
HCT VFR BLD AUTO: 35.5 % (ref 34–46.6)
HGB BLD-MCNC: 11.2 G/DL (ref 12–15.9)
IMM GRANULOCYTES # BLD AUTO: 0.01 10*3/MM3 (ref 0–0.05)
IMM GRANULOCYTES NFR BLD AUTO: 0.2 % (ref 0–0.5)
LYMPHOCYTES # BLD AUTO: 2.02 10*3/MM3 (ref 0.7–3.1)
LYMPHOCYTES NFR BLD AUTO: 37.3 % (ref 19.6–45.3)
MAGNESIUM SERPL-MCNC: 2 MG/DL (ref 1.6–2.4)
MCH RBC QN AUTO: 27.5 PG (ref 26.6–33)
MCHC RBC AUTO-ENTMCNC: 31.5 G/DL (ref 31.5–35.7)
MCV RBC AUTO: 87 FL (ref 79–97)
MONOCYTES # BLD AUTO: 0.62 10*3/MM3 (ref 0.1–0.9)
MONOCYTES NFR BLD AUTO: 11.4 % (ref 5–12)
NEUTROPHILS NFR BLD AUTO: 2.53 10*3/MM3 (ref 1.7–7)
NEUTROPHILS NFR BLD AUTO: 46.6 % (ref 42.7–76)
NRBC BLD AUTO-RTO: 0 /100 WBC (ref 0–0.2)
PHOSPHATE SERPL-MCNC: 3.1 MG/DL (ref 2.5–4.5)
PLATELET # BLD AUTO: 364 10*3/MM3 (ref 140–450)
PMV BLD AUTO: 9.9 FL (ref 6–12)
POTASSIUM SERPL-SCNC: 4.4 MMOL/L (ref 3.5–5.2)
PROT SERPL-MCNC: 6.8 G/DL (ref 6–8.5)
QT INTERVAL: 467 MS
QTC INTERVAL: 413 MS
RBC # BLD AUTO: 4.08 10*6/MM3 (ref 3.77–5.28)
SODIUM SERPL-SCNC: 137 MMOL/L (ref 136–145)
WBC NRBC COR # BLD AUTO: 5.42 10*3/MM3 (ref 3.4–10.8)

## 2025-07-02 PROCEDURE — 83036 HEMOGLOBIN GLYCOSYLATED A1C: CPT | Performed by: INTERNAL MEDICINE

## 2025-07-02 PROCEDURE — 97162 PT EVAL MOD COMPLEX 30 MIN: CPT

## 2025-07-02 PROCEDURE — 84100 ASSAY OF PHOSPHORUS: CPT | Performed by: INTERNAL MEDICINE

## 2025-07-02 PROCEDURE — 25010000002 MEROPENEM PER 100 MG: Performed by: INTERNAL MEDICINE

## 2025-07-02 PROCEDURE — 82948 REAGENT STRIP/BLOOD GLUCOSE: CPT

## 2025-07-02 PROCEDURE — 80053 COMPREHEN METABOLIC PANEL: CPT | Performed by: INTERNAL MEDICINE

## 2025-07-02 PROCEDURE — 85025 COMPLETE CBC W/AUTO DIFF WBC: CPT | Performed by: INTERNAL MEDICINE

## 2025-07-02 PROCEDURE — 83735 ASSAY OF MAGNESIUM: CPT | Performed by: INTERNAL MEDICINE

## 2025-07-02 RX ADMIN — MEROPENEM 500 MG: 500 INJECTION, POWDER, FOR SOLUTION INTRAVENOUS at 20:51

## 2025-07-02 RX ADMIN — ATENOLOL 25 MG: 25 TABLET ORAL at 09:29

## 2025-07-02 RX ADMIN — SENNOSIDES AND DOCUSATE SODIUM 2 TABLET: 50; 8.6 TABLET ORAL at 09:29

## 2025-07-02 RX ADMIN — LEVOTHYROXINE SODIUM 100 MCG: 0.1 TABLET ORAL at 09:29

## 2025-07-02 RX ADMIN — MODAFINIL 100 MG: 100 TABLET ORAL at 09:29

## 2025-07-02 RX ADMIN — MEROPENEM 500 MG: 500 INJECTION, POWDER, FOR SOLUTION INTRAVENOUS at 13:27

## 2025-07-02 RX ADMIN — Medication 1000 MCG: at 09:28

## 2025-07-02 RX ADMIN — Medication 10 ML: at 21:20

## 2025-07-02 RX ADMIN — Medication 10 ML: at 09:29

## 2025-07-02 RX ADMIN — DEXTROSE MONOHYDRATE 25 G: 25 INJECTION, SOLUTION INTRAVENOUS at 16:35

## 2025-07-02 RX ADMIN — MEROPENEM 500 MG: 500 INJECTION, POWDER, FOR SOLUTION INTRAVENOUS at 05:50

## 2025-07-02 RX ADMIN — DEXTROSE MONOHYDRATE 25 G: 25 INJECTION, SOLUTION INTRAVENOUS at 06:46

## 2025-07-02 RX ADMIN — DEXTROSE MONOHYDRATE 25 G: 25 INJECTION, SOLUTION INTRAVENOUS at 20:51

## 2025-07-02 NOTE — CASE MANAGEMENT/SOCIAL WORK
Discharge Planning Assessment  [facility]     Patient Name: Madeleine Tavares  MRN: [id number]  Today's Date: 7/2/2025    Admit Date: 7/1/2025    Plan: [facility] when medically ready   Discharge Needs Assessment       Row Name 07/02/25 0929       Living Environment    People in Home facility resident    Current Living Arrangements extended care facility    Able to Return to Prior Arrangements yes       Transition Planning    Patient/Family Anticipates Transition to long-term care facility    Patient/Family Anticipated Services at Transition     Transportation Anticipated health plan transportation       Discharge Needs Assessment    Readmission Within the Last 30 Days no previous admission in last 30 days    Current Outpatient/Agency/Support Group skilled nursing facility    Concerns to be Addressed discharge planning    Outpatient/Agency/Support Group Needs skilled nursing facility    Discharge Facility/Level of Care Needs nursing facility, intermediate                   Discharge Plan       Row Name 07/02/25 0929       Plan    Plan [facility] when medically ready    Patient/Family in Agreement with Plan yes    Plan Comments CCP s/w Mellissa/Guardian via phone. Introduced self and role of CCP. Face sheet information and pharmacy verified. Pt lives in LTC at [facility] and plans for her to return at [place]. CCP discussed listed family members on pts chart. Per Mellissa/Guardian only pts son Blue and her brother Yohannes are to be listed. Both Blue and Yohannes are allowed to receive updates and visit but no decision are to be made regarding the patient. LVM for Mica/John to confirm pts LOC and bed hold status. Lara WONG-RN/CCP Manager notified. Tami TAYLOR/CCP                  Continued Care and Services - Admitted Since 7/1/2025       Destination Coordination complete.      Service Provider Request Status Services Address Phone Fax Patient Preferred    [facility] [id number] [facility] [facility] [address] [phone] --                     Demographic Summary       Row Name 07/02/25 0929       General Information    Admission Type observation    Arrived From home    Required Notices Provided Observation Status Notice    Referral Source admission list;case finding    Reason for Consult discharge planning       Contact Information    Permission Granted to Share Info With guardian;                   Functional Status       Row Name 07/02/25 0929       Assessment of Health Literacy    How often do you have someone help you read hospital materials? Always    How often do you have problems learning about your medical condition because of difficulty understanding written information? Always    How often do you have a problem understanding what is told to you about your medical condition? Always    How confident are you filling out medical forms by yourself? Not at all    Health Literacy Low       Functional Status, IADL    Medications completely dependent    Meal Preparation completely dependent    Housekeeping completely dependent    Laundry completely dependent    Shopping completely dependent                               Brenda Harris, RN

## 2025-07-02 NOTE — H&P
Internal medicine history and physical  INTERNAL MEDICINE   [facility]       Patient Identification:  Name: Madeleine Tavares  Age: 84 y.o.  Sex: female  :  1940  MRN: [id number]                   Primary Care Physician: Terese Song MD                               Date of admission:2025    Chief Complaint: Sent from nursing home for evaluation of sudden onset of altered mental status.    History of Present Illness:   Source of information review of records and discussion with ER physician as patient herself is not able to engage in conversation due to underlying dementia.  Patient is a 84-year-old female with history of Alzheimer's disease, dementia, anxiety, prior history of recurrent urinary tract infection with ESBL positive gram-negative rods, history of schizoaffective disorder including depression and librado was in her usual state of her health at nursing home until 2 hours prior to coming to the hospital when she became confused and lethargic.  Patient was noted to have blood sugar of 50 for which she was given glucose with improvement in her symptoms only for symptoms to recur again.  Evaluation in the emergency room revealed elderly cachectic patient who does not engage in conversation but appears to be comfortable.  Workup included CT scan of the head chest x-ray and urinalysis and CBC and CMP.  CT scan of the head did not show any acute intracranial process but did show new changes consistent with stroke compared to last imaging studies.  Urinalysis is consistent with UTI.  Urine culture was sent patient was given a dose of Rocephin and patient is being admitted for further care.      Past Medical History:  Past Medical History:   Diagnosis Date    Abdominal hernia without obstruction and without gangrene     Altered mental state     Alzheimer disease     Anemia     Anxiety     Dementia     Depression     Diaphragmatic hernia     Difficulty walking     Diverticulitis  large intestine w/o perforation or abscess w/o bleeding     ESBL (extended spectrum beta-lactamase) producing bacteria infection     Falls     Gastritis     GI bleed     Hypertension     Karli     Narcolepsy due to underlying condition without cataplexy     UTI (urinary tract infection)      Past Surgical History:  Past Surgical History:   Procedure Laterality Date    CHOLECYSTECTOMY  08/15/2008    CYSTOSCOPY      multiple    HEMORROIDECTOMY      LAPAROSCOPIC HYSTERECTOMY  01/01/2000    VENA CAVA FILTER INSERTION  2012      Home Meds:  Medications Prior to Admission   Medication Sig Dispense Refill Last Dose/Taking    acetaminophen (TYLENOL) 325 MG tablet Take 2 tablets by mouth Every 4 (Four) Hours As Needed for mild pain (1-3).  0 Taking As Needed    albuterol sulfate  (90 Base) MCG/ACT inhaler Inhale 2 puffs Every 6 (Six) Hours As Needed for Wheezing.   Taking As Needed    atenolol (TENORMIN) 25 MG tablet Take 1 tablet by mouth Daily.   Taking    bisacodyl (Dulcolax) 10 MG suppository Insert 1 suppository into the rectum As Needed for Constipation.   Taking As Needed    bumetanide (BUMEX) 0.5 MG tablet Take 1 tablet by mouth Daily.   Taking    citric acid-potassium citrate (POLYCITRA) 1100-334 MG/5ML solution Take 10 mL by mouth Daily.   Taking    guaifenesin (ROBITUSSIN) 100 MG/5ML liquid Take 10 mL by mouth 3 (Three) Times a Day As Needed for Cough.   Taking As Needed    IRON PO Take 325 mg by mouth Daily.   Taking    lactulose (CHRONULAC) 10 GM/15ML solution Take 30 mL by mouth 2 (Two) Times a Day As Needed.   Taking As Needed    levothyroxine (SYNTHROID, LEVOTHROID) 75 MCG tablet Take 100 mcg by mouth Daily.   Taking    magnesium hydroxide (MILK OF MAGNESIA) 400 MG/5ML suspension Take 5 mL by mouth Daily As Needed.   Taking As Needed    modafinil (PROVIGIL) 100 MG tablet Take 1 tablet by mouth Daily.   Taking    omeprazole (prilOSEC) 10 MG capsule Take 1 capsule by mouth Daily.   Taking    polyethylene  glycol (MIRALAX) 17 g packet Take 17 g by mouth Daily.   Taking    sennosides-docusate (senna-docusate sodium) 8.6-50 MG per tablet Take 2 tablets by mouth Daily.   Taking    vitamin B-12 (CYANOCOBALAMIN) 1000 MCG tablet Take 1 tablet by mouth Daily.   Taking    ALPRAZolam (XANAX) 0.25 MG tablet Take 1 tablet by mouth Every 12 (Twelve) Hours As Needed for anxiety. [place] (Patient not taking: Reported on 7/1/2025) 60 tablet 0 Not Taking    aluminum-magnesium hydroxide-simethicone (MAALOX/MYLANTA) 200-200-20 MG/5ML suspension Take 20 mL by mouth Every 6 (Six) Hours As Needed for Indigestion or Heartburn. (Patient not taking: Reported on 7/1/2025)   Not Taking    baclofen (LIORESAL) 10 MG tablet Take 1 tablet by mouth 3 (Three) Times a Day. (Patient not taking: Reported on 7/1/2025)   Not Taking    benazepril (LOTENSIN) 5 MG tablet Take 1 tablet by mouth Daily. (Patient not taking: Reported on 7/1/2025)   Not Taking    dextromethorphan-guaifenesin (ROBITUSSIN-DM)  MG/5ML syrup Take 10 mL by mouth Every 6 (Six) Hours As Needed (cough). (Patient not taking: Reported on 7/1/2025)   Not Taking    dextrose (GLUTOSE) 40 % gel Take 15 g by mouth As Needed for low blood sugar. (Patient not taking: Reported on 7/1/2025)   Not Taking    donepezil (ARICEPT) 10 MG tablet Take 1 tablet by mouth Every Night. (Patient not taking: Reported on 7/1/2025)   Not Taking    DULoxetine (CYMBALTA) 60 MG capsule Take 1 capsule by mouth Daily. (Patient not taking: Reported on 7/1/2025)   Not Taking    FLUoxetine (PROzac) 10 MG capsule Take 1 capsule by mouth Daily. (Patient not taking: Reported on 7/1/2025)   Not Taking    glucagon, human recombinant, (GLUCAGEN DIAGNOSTIC) 1 MG injection Inject 1 mg under the skin 1 (One) Time As Needed (hypoglycemia) for up to 1 dose. (Patient not taking: Reported on 7/1/2025)   Not Taking    insulin aspart (novoLOG) 100 UNIT/ML injection Inject 0-7 Units under the skin 4 (Four) Times a Day Before  Meals & at Bedtime. (Patient not taking: Reported on 2025)  12 Not Taking    Magnesium Lactate (MAG-TAB SR PO) Take 250 mg/day by mouth. (Patient not taking: Reported on 2025)   Not Taking    ondansetron (ZOFRAN) 4 MG tablet Take 4 mg by mouth Every 8 (Eight) Hours As Needed for Nausea or Vomiting. (Patient not taking: Reported on 2025)   Not Taking    oxyCODONE-acetaminophen (PERCOCET)  MG per tablet Take 1 tablet by mouth Every 4 (Four) Hours. [place] (Patient not taking: Reported on 2025) 180 tablet 0 Not Taking    potassium citrate (UROCIT-K) 10 MEQ (1080 MG) CR tablet Take 1 tablet by mouth 3 (Three) Times a Day With Meals. (Patient not taking: Reported on 2025)   Not Taking    promethazine (PHENERGAN) 25 MG tablet Take 25 mg by mouth Every 6 (Six) Hours As Needed for nausea or vomiting. (Patient not taking: Reported on 2025)   Not Taking    rivaroxaban (XARELTO) 15 MG tablet Take 15 mg by mouth Daily. (Patient not taking: Reported on 2025)   Not Taking    sucralfate (CARAFATE) 1 g tablet Take 1 g by mouth 4 (Four) Times a Day. (Patient not taking: Reported on 2025)   Not Taking     Current Meds:     Current Facility-Administered Medications:     nitroglycerin (NITROSTAT) SL tablet 0.4 mg, 0.4 mg, Sublingual, Q5 Min PRN, Edwar Rdz MD    nitroglycerin (NITROSTAT) SL tablet 0.4 mg, 0.4 mg, Sublingual, Q5 Min PRN, Edwar Rdz MD  Allergies:  Allergies   Allergen Reactions    Levaquin [Levofloxacin]     Macrobid [Nitrofurantoin Monohyd Macro]     Penicillins     Sulfa Antibiotics      Social History:   Social History     Tobacco Use    Smoking status: Former     Current packs/day: 0.00     Average packs/day: 1 pack/day for 15.0 years (15.0 ttl pk-yrs)     Types: Cigarettes     Start date:      Quit date:      Years since quittin.5    Smokeless tobacco: Not on file    Tobacco comments:     EX-SMOKER   Substance Use Topics    Alcohol use: No      Family  "History:  Family History   Problem Relation Age of Onset    Diabetes Other     Coronary artery disease Other           Review of Systems  See history of present illness and past medical history.  Could not be obtained from the patient      Vitals:   /49 (BP Location: Right arm, Patient Position: Lying)   Pulse (!) 45   Temp 95.9 °F (35.5 °C) (Rectal)   Resp 19   Ht 175.3 cm (69\")   Wt 67.5 kg (148 lb 13 oz)   SpO2 90%   BMI 21.98 kg/m²   I/O:   Intake/Output Summary (Last 24 hours) at 7/1/2025 2054  Last data filed at 7/1/2025 1608  Gross per 24 hour   Intake 100 ml   Output --   Net 100 ml     Exam:  Patient is examined using the personal protective equipment as per guidelines from infection control for this particular patient as enacted.  Hand washing was performed before and after patient interaction.  General Appearance:  Awake comfortable chronically ill emaciated nontoxic-appearing elderly female who does not engage in conversation but looks around.   Head:    Normocephalic, without obvious abnormality, atraumatic   Eyes:  Extraocular movement intact   Ears:    Normal external ear canals, both ears   Nose:   Nares normal, septum midline, mucosa normal, no drainage    or sinus tenderness   Throat: Dry oral mucosa   Neck:   Supple   Back:     Symmetric, no curvature, ROM normal, no CVA tenderness   Lungs:     Clear to auscultation bilaterally, respirations unlabored   Chest Wall:    No tenderness or deformity    Heart:    Regular rate and rhythm,   Abdomen:   Soft nontender   Extremities: Disuse atrophy noted       Skin: Chronic changes noted   Neurologic: Spontaneously moving upper and lower extremities but does not engage in conversation or follow commands.       Data Review:      I reviewed the patient's new clinical results.  Results from last 7 days   Lab Units 07/01/25  1414   WBC 10*3/mm3 5.76   HEMOGLOBIN g/dL 12.2   PLATELETS 10*3/mm3 381     Results from last 7 days   Lab Units " 07/01/25  1414   SODIUM mmol/L 139   POTASSIUM mmol/L 4.7   CHLORIDE mmol/L 104   CO2 mmol/L 25.0   BUN mg/dL 13.0   CREATININE mg/dL 1.05*   CALCIUM mg/dL 9.7   GLUCOSE mg/dL 99     Brief Urine Lab Results  (Last result in the past 365 days)        Color   Clarity   Blood   Leuk Est   Nitrite   Protein   CREAT   Urine HCG        07/01/25 1402 Yellow   Turbid   Negative   Large (3+)   Positive   30 mg/dL (1+)                 CT Head Without Contrast  Result Date: 7/1/2025  1. No convincing acute intracranial abnormality is identified.  2. There is mild-to-moderate small vessel disease in the cerebral white matter.  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 There is diffuse cerebral atrophy and the lateral and third ventricles are prominent in size, felt to be on the basis of central volume loss or atrophy, and this patient small vessel disease and atrophy has both worsened since 05/26/2021. There is a right eye scleral band in place and mild increased density in the right globe when compared to the left, and correlation with ophthalmologic exam is suggested.  The remainder of the head CT is normal. If there remains any clinical suspicion of an acute stroke causing mental status change and increasing confusion, an MRI of the brain could be obtained for a more complete assessment.  Radiation dose reduction techniques were utilized, including automated exposure control and exposure modulation based on body size.       XR Chest 1 View  Result Date: 7/1/2025  1. Small linear band of increased density in the left midlung could be artifact. 2. No definite focal infiltrates are seen per 3. Hiatal hernia.   This report was finalized on 7/1/2025 2:42 PM by Dr. Jose Newsome M.D on  Workstation: [id number]      Telemetry Scan   Final Result      ECG 12 Lead Altered Mental Status   Preliminary Result   HEART RATE=47  bpm   RR Plyujnog=9832  ms   ID Sfmtmltz=735  ms   P Horizontal Axis=  deg   P Front Axis=5  deg   QRSD Interval=88  ms   QT Vtmhvqsr=555  ms   CUnK=717  ms   QRS Axis=59  deg   T Wave Axis=56  deg   - BORDERLINE ECG -   Sinus bradycardia   Borderline  prolonged ID interval   Consider  RVH or posterior infarct   When compared with ECG of 26-May-2021 11:47:14,   No significant change   Date and Time of Study:2025-07-01 13:50:14      Specimen Type : Urine  Specimen/Source: Urine/In and Out Cath  Collected: 11/08/2015 23:10 EST    Status: Final Last Updated: 11/11/2015 15:37 EST      Culture Result (Final)  >100,000 cfu/ml    Isolate (Final)  Escherichia coli - ESBL   Sent report to Infection Control on 11/11/15 by [id number]     Assessment:  Active Hospital Problems    Diagnosis  POA    **UTI (urinary tract infection) [N39.0]  Yes    Altered mental status [R41.82]  Unknown    Hypoglycemia [E16.2]  Unknown    Abnormal CT scan of head [R93.0]  Unknown    Alzheimer's disease [G30.9, F02.80]  Yes    Anxiety and depression [F41.9, F32.A]  Yes    UTI due to extended-spectrum beta lactamase (ESBL) producing Escherichia coli recurrent [N39.0, B96.29, Z16.12]  Yes    Anemia, chronic disease [D63.8]  Yes       Medical decision making/care plan: See admitting orders  Altered mental status-likely due to multiple reasons including urinary tract infection and hypoglycemia in the setting of Alzheimer's dementia-plan is to admit the patient provided with cautious hydration, follow-up urine culture and based on her prior history of ESBL positive E. coli UTI started on meropenem and adjust antibiotic therapy based on response to treatment and culture data.  Reported episode of hypoglycemia-plan is to provide her with D5 half-normal saline, provided with Accu-Cheks to monitor for hypoglycemia and  placed on hypoglycemia protocol.  Nutrition consultation.  Malnutrition with emaciated body habitus in the setting of dementia-nutrition evaluation.  May benefit from care structure discussion with family members.  Anxiety and depression and schizoaffective disorder anemia-continuing home regimen  Hypothyroidism-continue thyroid replacement therapy.  Hypertension-continue with atenolol.  History of gastroesophageal reflux disease-continue with Protonix.  Incomplete database due to dementia    Edwar Rdz MD   7/1/2025  20:54 EDT    Parts of this note may be an electronic transcription/translation of spoken language to printed text using the Dragon dictation system.

## 2025-07-02 NOTE — PROGRESS NOTES
Name: Madeleine Tavares ADMIT: 2025   : 1940  PCP: Terese Song MD    MRN: [id number] LOS: 0 days   AGE/SEX: 84 y.o. female  ROOM: [facility]     Subjective   Subjective   No acute events. No new complaints. No family at bedside.     Objective   Objective   Vital Signs  Temp:  [94.6 °F (34.8 °C)-99 °F (37.2 °C)] 97 °F (36.1 °C)  Heart Rate:  [38-58] 53  Resp:  [16-19] 16  BP: (110-132)/(45-66) 122/45  SpO2:  [72 %-99 %] 97 %  on   ;   Device (Oxygen Therapy): room air  Body mass index is 21.98 kg/m².  Physical Exam  Vitals and nursing note reviewed.   Constitutional:       General: She is not in acute distress.     Appearance: She is ill-appearing (chronically). She is not toxic-appearing.   HENT:      Head: Normocephalic and atraumatic.      Nose: Nose normal.      Mouth/Throat:      Mouth: Mucous membranes are moist.      Pharynx: Oropharynx is clear.   Cardiovascular:      Rate and Rhythm: Normal rate and regular rhythm.      Pulses: Normal pulses.   Pulmonary:      Effort: Pulmonary effort is normal.      Breath sounds: Normal breath sounds.   Abdominal:      General: Bowel sounds are normal. There is no distension.      Palpations: Abdomen is soft.      Tenderness: There is no abdominal tenderness.   Musculoskeletal:         General: No swelling or tenderness.      Cervical back: Neck supple.   Skin:     General: Skin is warm and dry.      Capillary Refill: Capillary refill takes less than 2 seconds.   Neurological:      General: No focal deficit present.      Mental Status: She is alert.   Psychiatric:         Cognition and Memory: Cognition is impaired (known dementia).       Results Review     I reviewed the patient's new clinical results.  Results from last 7 days   Lab Units 25  0323 25  1414   WBC 10*3/mm3 5.42 5.76   HEMOGLOBIN g/dL 11.2* 12.2   PLATELETS 10*3/mm3 364 381     Results from last 7 days   Lab Units 25  0322 25  1414   SODIUM mmol/L 137 139    POTASSIUM mmol/L 4.4 4.7   CHLORIDE mmol/L 107 104   CO2 mmol/L 22.1 25.0   BUN mg/dL 12.0 13.0   CREATININE mg/dL 1.01* 1.05*   GLUCOSE mg/dL 112* 99   EGFR mL/min/1.73 55.0* 52.8*     Results from last 7 days   Lab Units 07/02/25  0322 07/01/25  1414   ALBUMIN g/dL 3.1* 3.5   BILIRUBIN mg/dL <0.2 0.2   ALK PHOS U/L 66 72   AST (SGOT) U/L 16 18   ALT (SGPT) U/L 10 6     Results from last 7 days   Lab Units 07/02/25  0322 07/01/25  1414   CALCIUM mg/dL 9.3 9.7   ALBUMIN g/dL 3.1* 3.5   MAGNESIUM mg/dL 2.0  --    PHOSPHORUS mg/dL 3.1  --        Hemoglobin A1C   Date/Time Value Ref Range Status   07/02/2025 0323 5.90 (H) 4.80 - 5.60 % Final     Glucose   Date/Time Value Ref Range Status   07/02/2025 1122 68 (L) 70 - 130 mg/dL Final   07/02/2025 0830 132 (H) 70 - 130 mg/dL Final   07/02/2025 0628 43 (C) 70 - 130 mg/dL Final   07/01/2025 2126 94 70 - 130 mg/dL Final       CT Head Without Contrast  Result Date: 7/2/2025  1. No convincing acute intracranial abnormality is identified.  2. There is mild-to-moderate small vessel disease in the cerebral white matter.  There is a 2.3 cm old superior lateral left frontal cortical infarct, and an additional 2.2 cm old posterior inferior lateral left frontal infarct, and these are both in the left MCA territory and they appear either late subacute or chronic, but are new when compared to prior head CT on 05/26/2021. There is diffuse cerebral atrophy and the lateral and third ventricles are prominent in size, felt to be on the basis of central volume loss or atrophy, and this patient small vessel disease and atrophy has both worsened since 05/26/2021. There is a right eye scleral band in place and mild increased density in the right globe when compared to the left, and correlation with ophthalmologic exam is suggested.  The remainder of the head CT is normal. If there remains any clinical suspicion of an acute stroke causing mental status change and increasing confusion, an MRI  of the brain could be obtained for a more complete assessment.  Radiation dose reduction techniques were utilized, including automated exposure control and exposure modulation based on body size.   This report was finalized on 7/2/2025 6:09 AM by Dr. Karl Mahoney M.D on Workstation: [id number]      XR Chest 1 View  Result Date: 7/1/2025  1. Small linear band of increased density in the left midlung could be artifact. 2. No definite focal infiltrates are seen per 3. Hiatal hernia.   This report was finalized on 7/1/2025 2:42 PM by Dr. Jose Newsome M.D on Workstation: [id number]        I have personally reviewed all medications:  Scheduled Medications  atenolol, 25 mg, Oral, Daily  insulin lispro, 2-7 Units, Subcutaneous, 4x Daily AC & at Bedtime  levothyroxine, 100 mcg, Oral, Daily  meropenem, 500 mg, Intravenous, Q8H  modafinil, 100 mg, Oral, Daily  pantoprazole, 40 mg, Oral, Q AM  polyethylene glycol, 17 g, Oral, Daily  sennosides-docusate, 2 tablet, Oral, Daily  sodium chloride, 10 mL, Intravenous, Q12H  vitamin B-12, 1,000 mcg, Oral, Daily    Infusions  dextrose 5 % and sodium chloride 0.45 %, 100 mL/hr, Last Rate: Stopped (07/02/25 1327)    Diet  Diet: Regular/House; Texture: Mechanical Ground (NDD 2); Fluid Consistency: Thin (IDDSI 0)    I have personally reviewed:  [x]  Laboratory   [x]  Microbiology   [x]  Radiology   [x]  EKG/Telemetry  []  Cardiology/Vascular   []  Pathology    [x]  Records       Assessment/Plan     Active Hospital Problems    Diagnosis  POA    **Acute cystitis without hematuria [N30.00]  Yes    Essential hypertension [I10]  Yes    Metabolic encephalopathy [G93.41]  Yes    Hypoglycemia [E16.2]  Yes    Abnormal CT scan of head [R93.0]  Yes    Alzheimer's disease [G30.9, F02.80]  Yes    Anxiety and depression [F41.9, F32.A]  Yes    UTI due to extended-spectrum beta lactamase (ESBL) producing Escherichia coli recurrent [N39.0, B96.29, Z16.12]  Yes    Anemia, chronic disease [D63.8]  Yes       Resolved Hospital Problems   No resolved problems to display.   Acute Cystitis without Hematuria  - has hx of ESBL, continue meropenem  - urine culture growing E.Coli, await sensitivities    Metabolic Encephalopathy  - due to above, superimposed on dementia  - monitor with above treatment  - monitor for sundowning and redirect as needed    Hypoglycemia  - A1C 5.90%  - monitor on hypoglycemia protocol    HTN  - BP acceptable  - continue atenolol    Hypothyroidism  - continue levothyroxine     SCDs for DVT prophylaxis.  Full code.  Discussed with patient and nursing staff.  Anticipate discharge to SNU facility in 1-2 days.  Expected Discharge Date: 7/8/2025; Expected Discharge Time:       Tylor Mercado MD  [facility]  07/02/25  14:58 EDT

## 2025-07-02 NOTE — THERAPY EVALUATION
Patient Name: Madeleine Tavares  : 1940    MRN: [id number]                              Today's Date: 2025       Admit Date: 2025    Visit Dx:     ICD-10-CM ICD-9-CM   1. Acute cystitis without hematuria  N30.00 595.0   2. Altered mental status, unspecified altered mental status type  R41.82 780.97   3. Abnormal head CT  R93.0 793.0     Patient Active Problem List   Diagnosis    Possible Cerebrovascular accident (CVA)    Anemia, chronic disease    Chest pain on breathing    History of gastrointestinal hemorrhage    History of deep venous thrombosis    Hypercoagulable state    Labile hypertension    Hypersomnia    Iron deficiency anemia    Adiposity    Osteoarthritis    Transient alteration of awareness    Toxic metabolic encephalopathy multifactorial: volume dep., central acting drugs/UTI recurrent    Chronic tension-type headache, not intractable    Generalized weakness    Muscle twitching    Chronic kidney disease, stage II (mild)    Alzheimer's disease    Diverticulosis of large intestine    Primary narcolepsy without cataplexy    Manic depressive disorder    Decreased mobility    Anxiety and depression    UTI due to extended-spectrum beta lactamase (ESBL) producing Escherichia coli recurrent    Risk for falls    Acute cystitis without hematuria    Metabolic encephalopathy    Hypoglycemia    Abnormal CT scan of head    Essential hypertension     Past Medical History:   Diagnosis Date    Abdominal hernia without obstruction and without gangrene     Altered mental state     Alzheimer disease     Anemia     Anxiety     Dementia     Depression     Diaphragmatic hernia     Difficulty walking     Diverticulitis large intestine w/o perforation or abscess w/o bleeding     ESBL (extended spectrum beta-lactamase) producing bacteria infection     Falls     Gastritis     GI bleed     Hypertension     Karli     Narcolepsy due to underlying condition without cataplexy     UTI (urinary tract infection)      Past  Surgical History:   Procedure Laterality Date    CHOLECYSTECTOMY  08/15/2008    CYSTOSCOPY      multiple    HEMORROIDECTOMY      LAPAROSCOPIC HYSTERECTOMY  01/01/2000    VENA CAVA FILTER INSERTION  2012      General Information       Row Name 07/02/25 1452          Physical Therapy Time and Intention    Document Type evaluation  -     Mode of Treatment physical therapy  -       Row Name 07/02/25 1452          General Information    Patient Profile Reviewed yes  -     Existing Precautions/Restrictions fall  -     Barriers to Rehab medically complex;cognitive status;previous functional deficit  -       Row Name 07/02/25 1452          Living Environment    Current Living Arrangements extended care facility  -     People in Home facility resident  -       Row Name 07/02/25 1452          Cognition    Orientation Status (Cognition) oriented to;person  -       Row Name 07/02/25 1452          Safety Issues/Impairments Affecting Functional Mobility    Safety Issues Affecting Function (Mobility) ability to follow commands;at risk behavior observed;awareness of need for assistance;judgment;insight into deficits/self-awareness;problem-solving;safety precaution awareness;safety precautions follow-through/compliance;sequencing abilities  -     Impairments Affecting Function (Mobility) cognition;coordination;balance;endurance/activity tolerance;motor control;motor planning  -     Cognitive Impairments, Mobility Safety/Performance attention;awareness, need for assistance;impulsivity;insight into deficits/self-awareness;judgment;problem-solving/reasoning;safety precaution awareness;sequencing abilities  -               User Key  (r) = Recorded By, (t) = Taken By, (c) = Cosigned By      Initials Name Provider Type    Phoenix Prescott, PT Physical Therapist                   Mobility       Row Name 07/02/25 1452          Bed Mobility    Bed Mobility rolling left  -     Rolling Left [place] (Bed Mobility)  maximum assist (25% patient effort)  -     Assistive Device (Bed Mobility) bed rails  -     Comment, (Bed Mobility) max vcs and tcs . Pt doesnt follow commands and is very confused  -               User Key  (r) = Recorded By, (t) = Taken By, (c) = Cosigned By      Initials Name Provider Type    Phoenix Prescott, PT Physical Therapist                   Obj/Interventions       Row Name 07/02/25 1452          Range of Motion Comprehensive    General Range of Motion bilateral lower extremity ROM WFL  -       Row Name 07/02/25 1452          Strength Comprehensive (MMT)    General Manual Muscle Testing (MMT) Assessment lower extremity strength deficits identified  -       Row Name 07/02/25 1452          Motor Skills    Motor Skills coordination;functional endurance;neuro-muscular function  -     Coordination bilateral;severe impairment  -     Neuromuscular Function severe impairment;bilateral;lower extremity;upper extremity  -               User Key  (r) = Recorded By, (t) = Taken By, (c) = Cosigned By      Initials Name Provider Type    Phoenix Prescott, PT Physical Therapist                   Goals/Plan    No documentation.                  Clinical Impression       Row Name 07/02/25 [facility] 07/02/25 1452       Pain    Pretreatment Pain Rating --  - 0/10 - no pain  -    Posttreatment Pain Rating --  - 0/10 - no pain  -      Row Name 07/02/25 165 07/02/25 1452       Plan of Care Review    Plan of Care Reviewed With --  - patient  -    Progress --  - no change  -    Outcome Evaluation --  - Pt is a 83 y.o. female admitted for Altered Mental Status. She  has a past medical history of Abdominal hernia without obstruction and without gangrene, Altered mental state, Alzheimer disease, Anemia, Anxiety, Dementia, Depression, Diaphragmatic hernia, Difficulty walking, Diverticulitis large intestine w/o perforation or abscess w/o bleeding, ESBL (extended spectrum beta-lactamase) producing bacteria  infection, Falls, Gastritis, GI bleed, Hypertension, Karli, Narcolepsy due to underlying condition without cataplexy, and UTI. Pt lives in LTC at [facility] and plans for her to return at [place].    Pt received in bed AOx1, on RA, agreed to PT. Rn notified and approved. Pt was TAfor rolling. Max vcs and tcs and pt still has difficulties following commands, sequencing movement, and participating w/ PT. Completed BLE PROM. Pt very resistive. Spoke w/ Rn, Pt seems to be at PLOF, bed bound. Pt not appropriate for skilled PT services at the moment. To be discharged from PT as a result. Discharge recc to SNF, back to LTC facility.  -      Row Name 07/02/25 1651 07/02/25 1452       Therapy Assessment/Plan (PT)    Rehab Potential (PT) --  - poor  -    Criteria for Skilled Interventions Met (PT) --  - no  -    Therapy Frequency (PT) --  - evaluation only  -      Row Name 07/02/25 1651 07/02/25 1452       Positioning and Restraints    Pre-Treatment Position --  - in bed  -    Post Treatment Position --  - bed  -    In Bed --  - notified nsg;supine;call light within reach;encouraged to call for assist;exit alarm on  -              User Key  (r) = Recorded By, (t) = Taken By, (c) = Cosigned By      Initials Name Provider Type     Phoenix Martinez, PT Physical Therapist                   Outcome Measures       Row Name 07/02/25 1450          How much help from another person do you currently need...    Turning from your back to your side while in flat bed without using bedrails? 2  -     Moving from lying on back to sitting on the side of a flat bed without bedrails? 1  -     Moving to and from a bed to a chair (including a wheelchair)? 1  -     Standing up from a chair using your arms (e.g., wheelchair, bedside chair)? 1  -     Climbing 3-5 steps with a railing? 1  -     To walk in hospital room? 1  -     AM-PAC 6 Clicks Score (PT) 7  -     Highest Level of Mobility Goal Bed  Activities/Dependent Transfer-2  -       Row Name 07/02/25 1452          Functional Assessment    Outcome Measure Options AM-PAC 6 Clicks Basic Mobility (PT)  -               User Key  (r) = Recorded By, (t) = Taken By, (c) = Cosigned By      Initials Name Provider Type    Phoenix Prescott, PT Physical Therapist                                 Physical Therapy Education       Title: PT OT SLP Therapies (In Progress)       Topic: Physical Therapy (In Progress)       Point: Mobility training (In Progress)       Learning Progress Summary            Patient Nonacceptance, D,E, NL by  at 7/2/2025 1452                      Point: Home exercise program (In Progress)       Learning Progress Summary            Patient Nonacceptance, D,E, NL by  at 7/2/2025 1452                      Point: Body mechanics (In Progress)       Learning Progress Summary            Patient Nonacceptance, D,E, NL by  at 7/2/2025 1452                      Point: Precautions (In Progress)       Learning Progress Summary            Patient Nonacceptance, D,E, NL by  at 7/2/2025 1452                                      User Key       Initials Effective Dates Name Provider Type Discipline     05/28/25 -  Phoenix Martinez, PT Physical Therapist PT                  PT Recommendation and Plan     Progress: no change  Outcome Evaluation: Pt is a 83 y.o. female admitted for Altered Mental Status. She  has a past medical history of Abdominal hernia without obstruction and without gangrene, Altered mental state, Alzheimer disease, Anemia, Anxiety, Dementia, Depression, Diaphragmatic hernia, Difficulty walking, Diverticulitis large intestine w/o perforation or abscess w/o bleeding, ESBL (extended spectrum beta-lactamase) producing bacteria infection, Falls, Gastritis, GI bleed, Hypertension, Karli, Narcolepsy due to underlying condition without cataplexy, and UTI. Pt lives in LTC at [facility] and plans for her to return at [place].    Pt received  in bed AOx1, on RA, agreed to PT. Rn notified and approved. Pt was TAfor rolling. Max vcs and tcs and pt still has difficulties following commands, sequencing movement, and participating w/ PT. Completed BLE PROM. Pt very resistive. Spoke w/ Rn, Pt seems to be at PLOF, bed bound. Pt not appropriate for skilled PT services at the moment. To be discharged from PT as a result. Discharge recc to SNF, back to LTC facility.     Time Calculation:   PT Evaluation Complexity  History, PT Evaluation Complexity: 3 or more personal factors and/or comorbidities  Examination of Body Systems (PT Eval Complexity): total of 3 or more elements  Clinical Presentation (PT Evaluation Complexity): evolving  Clinical Decision Making (PT Evaluation Complexity): moderate complexity  Overall Complexity (PT Evaluation Complexity): moderate complexity     PT Charges       Row Name 07/02/25 1452             Time Calculation    Start Time 1452  -      Stop Time 1503  -      Time Calculation (min) 11 min  -      PT Received On 07/02/25  -      PT Goal Re-Cert Due Date 07/16/25  -                User Key  (r) = Recorded By, (t) = Taken By, (c) = Cosigned By      Initials Name Provider Type     Phoenix Martinez, PT Physical Therapist                  Therapy Charges for Today       Code Description Service Date Service Provider Modifiers Qty    69581521384 HC PT EVAL MOD COMPLEXITY 2 7/2/2025 Phoenix Martinez PT GP 1            PT G-Codes  Outcome Measure Options: AM-PAC 6 Clicks Basic Mobility (PT)  AM-PAC 6 Clicks Score (PT): 7  PT Discharge Summary  Anticipated Discharge Disposition (PT): skilled nursing facility    Phoenix Martinez PT  7/2/2025

## 2025-07-02 NOTE — PLAN OF CARE
Goal Outcome Evaluation:  Plan of Care Reviewed With: patient   Pt is being treated for acute cystitis without hematuria. Vital signs are stable. Blood sugar is up and down. Dextrose D50 given x1. Sinus caio on the monitor. Care plan ongoing     Progress: no change

## 2025-07-02 NOTE — DISCHARGE PLACEMENT REQUEST
"Ayan Murray (84 y.o. Female)       Date of Birth   1940    Social Security Number       Address   [address] [place], [place] [address]    Home Phone   [phone]    MRN   [id number]       [facility]    Marital Status                               Admission Date   7/1/2025    Admission Type   Emergency    Admitting Provider   Edwar Rdz MD    Attending Provider   Tylor Mercado MD    Department, Room/Bed   [address], [address]/1       Discharge Date       Discharge Disposition       Discharge Destination                                 Attending Provider: Tylor Mercado MD    Allergies: Levaquin [Levofloxacin], Macrobid [Nitrofurantoin Monohyd Macro], Penicillins, Sulfa Antibiotics    Isolation: None   Infection: None   Code Status: Prior    Ht: 175.3 cm (69\")   Wt: 67.5 kg (148 lb 13 oz)    Admission Cmt: None   Principal Problem: UTI (urinary tract infection) [N39.0]                   Active Insurance as of 7/1/2025       Primary Coverage       Payor Plan Insurance Group Employer/Plan Group    MEDICARE MEDICARE A & B        Payor Plan Address Payor Plan Phone Number Payor Plan Fax Number Effective Dates    [address] [phone]  11/1/2000 - None Entered    [facility] [id number]         Subscriber Name Subscriber Birth Date Member ID       AYAN MURRAY 1940 [id number]               Secondary Coverage       Payor Plan Insurance Group Employer/Plan Group    [place]        Payor Plan Address Payor Plan Phone Number Payor Plan Fax Number Effective Dates    [address] [phone]  7/1/2025 - None Entered    [place] [address]         Subscriber Name Subscriber Birth Date Member ID       AYAN MURRAY 1940 [id number]                     Emergency Contacts        (Rel.) Home Phone Work Phone Mobile Phone    MARLO VALENZUELA (Legal Guardian) [phone] -- [phone]    " Allison Cash (D-I-L) (Other) -- -- [phone]    Thomas Tavares (Son) [phone] -- --    Yohannes Carroll (Brother) -- -- [phone]    Daisy Michael (NIECE) (Relative) [phone] -- [phone]

## 2025-07-02 NOTE — PLAN OF CARE
Goal Outcome Evaluation:  Plan of Care Reviewed With: patient        Progress: no change  Outcome Evaluation: Pt is a 83 y.o. female admitted for Altered Mental Status. She  has a past medical history of Abdominal hernia without obstruction and without gangrene, Altered mental state, Alzheimer disease, Anemia, Anxiety, Dementia, Depression, Diaphragmatic hernia, Difficulty walking, Diverticulitis large intestine w/o perforation or abscess w/o bleeding, ESBL (extended spectrum beta-lactamase) producing bacteria infection, Falls, Gastritis, GI bleed, Hypertension, Karli, Narcolepsy due to underlying condition without cataplexy, and UTI. Pt lives in LTC at [facility] and plans for her to return at [place].    Pt received in bed AOx1, on RA, agreed to PT. Rn notified and approved. Pt was TAfor rolling. Max vcs and tcs and pt still has difficulties following commands, sequencing movement, and participating w/ PT. Completed BLE PROM. Pt very resistive. Spoke w/ Rn, Pt seems to be at PLOF, bed bound. Pt not appropriate for skilled PT services at the moment. To be discharged from PT as a result. Discharge recc to SNF, back to LTC facility.    Anticipated Discharge Disposition (PT): skilled nursing facility

## 2025-07-03 LAB
ANION GAP SERPL CALCULATED.3IONS-SCNC: 9.3 MMOL/L (ref 5–15)
BACTERIA SPEC AEROBE CULT: ABNORMAL
BASOPHILS # BLD AUTO: 0.04 10*3/MM3 (ref 0–0.2)
BASOPHILS NFR BLD AUTO: 0.6 % (ref 0–1.5)
BUN SERPL-MCNC: 10 MG/DL (ref 8–23)
BUN/CREAT SERPL: 9.8 (ref 7–25)
CALCIUM SPEC-SCNC: 9.5 MG/DL (ref 8.6–10.5)
CHLORIDE SERPL-SCNC: 107 MMOL/L (ref 98–107)
CO2 SERPL-SCNC: 21.7 MMOL/L (ref 22–29)
CREAT SERPL-MCNC: 1.02 MG/DL (ref 0.57–1)
DEPRECATED RDW RBC AUTO: 40.6 FL (ref 37–54)
EGFRCR SERPLBLD CKD-EPI 2021: 54.4 ML/MIN/1.73
EOSINOPHIL # BLD AUTO: 0.22 10*3/MM3 (ref 0–0.4)
EOSINOPHIL NFR BLD AUTO: 3.3 % (ref 0.3–6.2)
ERYTHROCYTE [DISTWIDTH] IN BLOOD BY AUTOMATED COUNT: 12 % (ref 12.3–15.4)
GLUCOSE BLDC GLUCOMTR-MCNC: 14 MG/DL (ref 70–130)
GLUCOSE BLDC GLUCOMTR-MCNC: 202 MG/DL (ref 70–130)
GLUCOSE BLDC GLUCOMTR-MCNC: 33 MG/DL (ref 70–130)
GLUCOSE BLDC GLUCOMTR-MCNC: 39 MG/DL (ref 70–130)
GLUCOSE BLDC GLUCOMTR-MCNC: 56 MG/DL (ref 70–130)
GLUCOSE BLDC GLUCOMTR-MCNC: 56 MG/DL (ref 70–130)
GLUCOSE BLDC GLUCOMTR-MCNC: 58 MG/DL (ref 70–130)
GLUCOSE BLDC GLUCOMTR-MCNC: 60 MG/DL (ref 70–130)
GLUCOSE BLDC GLUCOMTR-MCNC: 68 MG/DL (ref 70–130)
GLUCOSE BLDC GLUCOMTR-MCNC: 68 MG/DL (ref 70–130)
GLUCOSE SERPL-MCNC: 92 MG/DL (ref 65–99)
HCT VFR BLD AUTO: 38.5 % (ref 34–46.6)
HGB BLD-MCNC: 11.6 G/DL (ref 12–15.9)
IMM GRANULOCYTES # BLD AUTO: 0.01 10*3/MM3 (ref 0–0.05)
IMM GRANULOCYTES NFR BLD AUTO: 0.2 % (ref 0–0.5)
LYMPHOCYTES # BLD AUTO: 2.55 10*3/MM3 (ref 0.7–3.1)
LYMPHOCYTES NFR BLD AUTO: 38.5 % (ref 19.6–45.3)
MCH RBC QN AUTO: 27.4 PG (ref 26.6–33)
MCHC RBC AUTO-ENTMCNC: 30.1 G/DL (ref 31.5–35.7)
MCV RBC AUTO: 91 FL (ref 79–97)
MONOCYTES # BLD AUTO: 0.91 10*3/MM3 (ref 0.1–0.9)
MONOCYTES NFR BLD AUTO: 13.7 % (ref 5–12)
NEUTROPHILS NFR BLD AUTO: 2.89 10*3/MM3 (ref 1.7–7)
NEUTROPHILS NFR BLD AUTO: 43.7 % (ref 42.7–76)
NRBC BLD AUTO-RTO: 0 /100 WBC (ref 0–0.2)
PLATELET # BLD AUTO: 348 10*3/MM3 (ref 140–450)
PMV BLD AUTO: 9.8 FL (ref 6–12)
POTASSIUM SERPL-SCNC: 4.9 MMOL/L (ref 3.5–5.2)
RBC # BLD AUTO: 4.23 10*6/MM3 (ref 3.77–5.28)
SODIUM SERPL-SCNC: 138 MMOL/L (ref 136–145)
WBC NRBC COR # BLD AUTO: 6.62 10*3/MM3 (ref 3.4–10.8)

## 2025-07-03 PROCEDURE — 25010000002 CEFTRIAXONE PER 250 MG: Performed by: INTERNAL MEDICINE

## 2025-07-03 PROCEDURE — 80048 BASIC METABOLIC PNL TOTAL CA: CPT | Performed by: INTERNAL MEDICINE

## 2025-07-03 PROCEDURE — 25010000002 MEROPENEM PER 100 MG: Performed by: INTERNAL MEDICINE

## 2025-07-03 PROCEDURE — 82948 REAGENT STRIP/BLOOD GLUCOSE: CPT

## 2025-07-03 PROCEDURE — 85025 COMPLETE CBC W/AUTO DIFF WBC: CPT | Performed by: INTERNAL MEDICINE

## 2025-07-03 RX ORDER — DEXTROSE MONOHYDRATE 100 MG/ML
75 INJECTION, SOLUTION INTRAVENOUS CONTINUOUS
Status: ACTIVE | OUTPATIENT
Start: 2025-07-03 | End: 2025-07-03

## 2025-07-03 RX ADMIN — Medication 10 ML: at 09:52

## 2025-07-03 RX ADMIN — DEXTROSE 15 G: 15 GEL ORAL at 05:02

## 2025-07-03 RX ADMIN — Medication 1000 MCG: at 08:11

## 2025-07-03 RX ADMIN — MODAFINIL 100 MG: 100 TABLET ORAL at 08:12

## 2025-07-03 RX ADMIN — LEVOTHYROXINE SODIUM 100 MCG: 0.1 TABLET ORAL at 08:12

## 2025-07-03 RX ADMIN — MEROPENEM 500 MG: 500 INJECTION, POWDER, FOR SOLUTION INTRAVENOUS at 05:02

## 2025-07-03 RX ADMIN — CEFTRIAXONE SODIUM 1000 MG: 1 INJECTION, POWDER, FOR SOLUTION INTRAMUSCULAR; INTRAVENOUS at 15:34

## 2025-07-03 RX ADMIN — DEXTROSE MONOHYDRATE 25 G: 25 INJECTION, SOLUTION INTRAVENOUS at 20:45

## 2025-07-03 RX ADMIN — DEXTROSE MONOHYDRATE 75 ML/HR: 100 INJECTION, SOLUTION INTRAVENOUS at 04:39

## 2025-07-03 RX ADMIN — Medication 10 ML: at 20:32

## 2025-07-03 RX ADMIN — ATENOLOL 25 MG: 25 TABLET ORAL at 08:11

## 2025-07-03 RX ADMIN — SENNOSIDES AND DOCUSATE SODIUM 2 TABLET: 50; 8.6 TABLET ORAL at 08:11

## 2025-07-03 NOTE — PLAN OF CARE
Goal Outcome Evaluation:  Plan of Care Reviewed With: patient   Pt's vitals signs are stable. Remains on room air. IV antibiotics started. Blood sugar is still up and down. Given orange juice for blood sugar. Care plan ongoing     Progress: no change

## 2025-07-03 NOTE — PROGRESS NOTES
Name: Madeleine Tavares ADMIT: 2025   : 1940  PCP: Terese Song MD    MRN: [id number] LOS: 1 days   AGE/SEX: 84 y.o. female  ROOM: [facility]     Subjective   Subjective   Patient had some hypoglycemia. Taking PO but not much. No family at bedside.     Objective   Objective   Vital Signs  Temp:  [96.1 °F (35.6 °C)-98.1 °F (36.7 °C)] 98.1 °F (36.7 °C)  Heart Rate:  [47-53] 53  Resp:  [16-18] 18  BP: (104-129)/(61-88) 104/88  SpO2:  [97 %-100 %] 100 %  on   ;   Device (Oxygen Therapy): room air  Body mass index is 21.98 kg/m².  Physical Exam  Vitals and nursing note reviewed.   Constitutional:       General: She is not in acute distress.     Appearance: She is ill-appearing (chronically). She is not toxic-appearing.   HENT:      Head: Normocephalic and atraumatic.      Nose: Nose normal.      Mouth/Throat:      Mouth: Mucous membranes are moist.      Pharynx: Oropharynx is clear.   Cardiovascular:      Rate and Rhythm: Normal rate and regular rhythm.      Pulses: Normal pulses.   Pulmonary:      Effort: Pulmonary effort is normal.      Breath sounds: Normal breath sounds.   Abdominal:      General: Bowel sounds are normal. There is no distension.      Palpations: Abdomen is soft.      Tenderness: There is no abdominal tenderness.   Musculoskeletal:         General: No swelling or tenderness.      Cervical back: Neck supple.   Skin:     General: Skin is warm and dry.      Capillary Refill: Capillary refill takes less than 2 seconds.   Neurological:      General: No focal deficit present.      Mental Status: She is alert.   Psychiatric:         Cognition and Memory: Cognition is impaired (known dementia).       Results Review     I reviewed the patient's new clinical results.  Results from last 7 days   Lab Units 25  0316 25  0323 25  1414   WBC 10*3/mm3 6.62 5.42 5.76   HEMOGLOBIN g/dL 11.6* 11.2* 12.2   PLATELETS 10*3/mm3 348 364 381     Results from last 7 days   Lab Units  07/03/25  0316 07/02/25  0322 07/01/25  1414   SODIUM mmol/L 138 137 139   POTASSIUM mmol/L 4.9 4.4 4.7   CHLORIDE mmol/L 107 107 104   CO2 mmol/L 21.7* 22.1 25.0   BUN mg/dL 10.0 12.0 13.0   CREATININE mg/dL 1.02* 1.01* 1.05*   GLUCOSE mg/dL 92 112* 99   EGFR mL/min/1.73 54.4* 55.0* 52.8*     Results from last 7 days   Lab Units 07/02/25  0322 07/01/25  1414   ALBUMIN g/dL 3.1* 3.5   BILIRUBIN mg/dL <0.2 0.2   ALK PHOS U/L 66 72   AST (SGOT) U/L 16 18   ALT (SGPT) U/L 10 6     Results from last 7 days   Lab Units 07/03/25 0316 07/02/25 0322 07/01/25  1414   CALCIUM mg/dL 9.5 9.3 9.7   ALBUMIN g/dL  --  3.1* 3.5   MAGNESIUM mg/dL  --  2.0  --    PHOSPHORUS mg/dL  --  3.1  --        Hemoglobin A1C   Date/Time Value Ref Range Status   07/02/2025 0323 5.90 (H) 4.80 - 5.60 % Final     Glucose   Date/Time Value Ref Range Status   07/03/2025 1132 68 (L) 70 - 130 mg/dL Final   07/03/2025 0955 60 (L) 70 - 130 mg/dL Final   07/03/2025 0751 56 (L) 70 - 130 mg/dL Final   07/03/2025 0458 68 (L) 70 - 130 mg/dL Final   07/03/2025 0451 14 (C) 70 - 130 mg/dL Final   07/03/2025 0450 33 (C) 70 - 130 mg/dL Final   07/03/2025 0408 39 (C) 70 - 130 mg/dL Final       CT Head Without Contrast  Result Date: 7/2/2025  1. No convincing acute intracranial abnormality is identified.  2. There is mild-to-moderate small vessel disease in the cerebral white matter.  There is a 2.3 cm old superior lateral left frontal cortical infarct, and an additional 2.2 cm old posterior inferior lateral left frontal infarct, and these are both in the left MCA territory and they appear either late subacute or chronic, but are new when compared to prior head CT on 05/26/2021. There is diffuse cerebral atrophy and the lateral and third ventricles are prominent in size, felt to be on the basis of central volume loss or atrophy, and this patient small vessel disease and atrophy has both worsened since 05/26/2021. There is a right eye scleral band in place and mild  increased density in the right globe when compared to the left, and correlation with ophthalmologic exam is suggested.  The remainder of the head CT is normal. If there remains any clinical suspicion of an acute stroke causing mental status change and increasing confusion, an MRI of the brain could be obtained for a more complete assessment.  Radiation dose reduction techniques were utilized, including automated exposure control and exposure modulation based on body size.   This report was finalized on 7/2/2025 6:09 AM by Dr. Karl Mahoney M.D on Workstation: [id number]      XR Chest 1 View  Result Date: 7/1/2025  1. Small linear band of increased density in the left midlung could be artifact. 2. No definite focal infiltrates are seen per 3. Hiatal hernia.   This report was finalized on 7/1/2025 2:42 PM by Dr. Jose Newsome M.D on Workstation: [id number]        I have personally reviewed all medications:  Scheduled Medications  atenolol, 25 mg, Oral, Daily  insulin lispro, 2-7 Units, Subcutaneous, 4x Daily AC & at Bedtime  levothyroxine, 100 mcg, Oral, Daily  modafinil, 100 mg, Oral, Daily  pantoprazole, 40 mg, Oral, Q AM  polyethylene glycol, 17 g, Oral, Daily  sennosides-docusate, 2 tablet, Oral, Daily  sodium chloride, 10 mL, Intravenous, Q12H  vitamin B-12, 1,000 mcg, Oral, Daily    Infusions     Diet  Diet: Regular/House; Texture: Mechanical Ground (NDD 2); Fluid Consistency: Thin (IDDSI 0)    I have personally reviewed:  [x]  Laboratory   [x]  Microbiology   []  Radiology   [x]  EKG/Telemetry  []  Cardiology/Vascular   []  Pathology    []  Records       Assessment/Plan     Active Hospital Problems    Diagnosis  POA    **Acute cystitis without hematuria [N30.00]  Yes    Essential hypertension [I10]  Yes    Metabolic encephalopathy [G93.41]  Yes    Hypoglycemia [E16.2]  Yes    Abnormal CT scan of head [R93.0]  Yes    Alzheimer's disease [G30.9, F02.80]  Yes    Anxiety and depression [F41.9, F32.A]  Yes     UTI due to extended-spectrum beta lactamase (ESBL) producing Escherichia coli recurrent [N39.0, B96.29, Z16.12]  Yes    Anemia, chronic disease [D63.8]  Yes      Resolved Hospital Problems   No resolved problems to display.   Acute Cystitis without Hematuria  - has hx of ESBL  - urine culture growing E.Coli, sensitivities noted-narrow abx from merrem to rocephin    Metabolic Encephalopathy  - due to above, superimposed on dementia  - monitor with above treatment  - monitor for sundowning and redirect as needed    Hypoglycemia  - A1C 5.90%  - monitor on hypoglycemia protocol  - encourage PO and give nutritional supplementation    HTN  - BP acceptable  - continue atenolol    Hypothyroidism  - continue levothyroxine     SCDs for DVT prophylaxis.  Full code.  Discussed with patient and nursing staff.  Anticipate discharge to SNU facility in 1-2 days.  Expected Discharge Date: 7/8/2025; Expected Discharge Time:       Tylor Mercado MD  [facility]  07/03/25  14:30 EDT    Portions of this text have been copied and I have reviewed them. They are accurate as of 7/3/2025

## 2025-07-03 NOTE — PROGRESS NOTES
"Nutrition Services    Patient Name: Madeleine Tavares  YOB: 1940  MRN: [id number]  Admission date: 7/1/2025    Assessment Date:  07/03/25    NUTRITION EVALUATION      Reason for Encounter MST score 2+   Diagnosis/Problem Admission Diagnosis:  UTI (urinary tract infection) [N39.0]  Abnormal head CT [R93.0]  Acute cystitis without hematuria [N30.00]  Altered mental status, unspecified altered mental status type [R41.82]    Problem List:    Acute cystitis without hematuria    Anemia, chronic disease    Alzheimer's disease    Anxiety and depression    UTI due to extended-spectrum beta lactamase (ESBL) producing Escherichia coli recurrent    Metabolic encephalopathy    Hypoglycemia    Abnormal CT scan of head    Essential hypertension     Narrative 83 y.o. female with a medical history of anemia, diabetes, hypersomnia, narcolepsy who presented to the ED c/o acute altered mental status.     Weight difference of 70# on 7/1. Asked RN for updated weight. Attempted pt visit, unable to understand speech and pt very confused.        PO Diet Diet: Regular/House; Texture: Mechanical Ground (NDD 2); Fluid Consistency: Thin (IDDSI 0)   Allergies NKFA   Supplements n/a   PO Intake % No intake data available       Chewing/Swallowing Difficulty dysphagia and other:Mech ground texture       Medications reviewed, pericolace   Labs  reviewed, gluc        Physical Findings disoriented, room air     Edema no edema    GI Function fecal incontinence, last bowel movement: 7/3   Skin Status bruising    Lines/Drains none   I/O reviewed        Height  Weight  BMI  Weight Trend     Height: 175.3 cm (69\")  Weight: 67.5 kg (148 lb 13 oz) (07/01/25 1908)  Body mass index is 21.98 kg/m².  Stable    Weight change: No significant changes per review of previous office visits       NFPE Unable to perform due to: Unable to obtain pt consent       Nutrition Problem (PES) Problem: Predicted Suboptimal Intake  Etiology: Medical Diagnosis - " altered mental status   Signs/Symptoms: Report/Observation       Intervention/Plan Initiate Boost BID to aid with intake- Boost Original BID (Provides 480 kcals, 20 g protein if consumed)      Monitor and encourage PO intake when possible.     RD to follow up per protocol.     Results from last 7 days   Lab Units 07/03/25 0316 07/02/25  0322 07/01/25  1414   SODIUM mmol/L 138 137 139   POTASSIUM mmol/L 4.9 4.4 4.7   CHLORIDE mmol/L 107 107 104   CO2 mmol/L 21.7* 22.1 25.0   BUN mg/dL 10.0 12.0 13.0   CREATININE mg/dL 1.02* 1.01* 1.05*   CALCIUM mg/dL 9.5 9.3 9.7   BILIRUBIN mg/dL  --  <0.2 0.2   ALK PHOS U/L  --  66 72   ALT (SGPT) U/L  --  10 6   AST (SGOT) U/L  --  16 18   GLUCOSE mg/dL 92 112* 99     Results from last 7 days   Lab Units 07/03/25 0316 07/02/25  0323 07/02/25  0322   MAGNESIUM mg/dL  --   --  2.0   PHOSPHORUS mg/dL  --   --  3.1   HEMOGLOBIN g/dL 11.6*   < >  --    HEMATOCRIT % 38.5   < >  --     < > = values in this interval not displayed.     Lab Results   Component Value Date    HGBA1C 5.90 (H) 07/02/2025     Wt Readings from Last 10 Encounters:   07/01/25 67.5 kg (148 lb 13 oz)   12/27/16 102 kg (224 lb 6 oz)   12/26/16 102 kg (224 lb)       Electronically signed by:  Chelsey Wise RD  07/03/25 10:55 EDT

## 2025-07-04 LAB
ANION GAP SERPL CALCULATED.3IONS-SCNC: 10 MMOL/L (ref 5–15)
BASOPHILS # BLD AUTO: 0.02 10*3/MM3 (ref 0–0.2)
BASOPHILS NFR BLD AUTO: 0.3 % (ref 0–1.5)
BUN SERPL-MCNC: 10 MG/DL (ref 8–23)
BUN/CREAT SERPL: 10.4 (ref 7–25)
CALCIUM SPEC-SCNC: 9.2 MG/DL (ref 8.6–10.5)
CHLORIDE SERPL-SCNC: 107 MMOL/L (ref 98–107)
CO2 SERPL-SCNC: 20 MMOL/L (ref 22–29)
CREAT SERPL-MCNC: 0.96 MG/DL (ref 0.57–1)
DEPRECATED RDW RBC AUTO: 39.5 FL (ref 37–54)
EGFRCR SERPLBLD CKD-EPI 2021: 58.5 ML/MIN/1.73
EOSINOPHIL # BLD AUTO: 0.21 10*3/MM3 (ref 0–0.4)
EOSINOPHIL NFR BLD AUTO: 3.3 % (ref 0.3–6.2)
ERYTHROCYTE [DISTWIDTH] IN BLOOD BY AUTOMATED COUNT: 12.1 % (ref 12.3–15.4)
GLUCOSE BLDC GLUCOMTR-MCNC: 111 MG/DL (ref 70–130)
GLUCOSE BLDC GLUCOMTR-MCNC: 150 MG/DL (ref 70–130)
GLUCOSE BLDC GLUCOMTR-MCNC: 23 MG/DL (ref 70–130)
GLUCOSE BLDC GLUCOMTR-MCNC: 59 MG/DL (ref 70–130)
GLUCOSE BLDC GLUCOMTR-MCNC: 89 MG/DL (ref 70–130)
GLUCOSE BLDC GLUCOMTR-MCNC: 96 MG/DL (ref 70–130)
GLUCOSE SERPL-MCNC: 76 MG/DL (ref 65–99)
HCT VFR BLD AUTO: 36.6 % (ref 34–46.6)
HGB BLD-MCNC: 11.4 G/DL (ref 12–15.9)
IMM GRANULOCYTES # BLD AUTO: 0.02 10*3/MM3 (ref 0–0.05)
IMM GRANULOCYTES NFR BLD AUTO: 0.3 % (ref 0–0.5)
LYMPHOCYTES # BLD AUTO: 2.61 10*3/MM3 (ref 0.7–3.1)
LYMPHOCYTES NFR BLD AUTO: 41.1 % (ref 19.6–45.3)
MCH RBC QN AUTO: 27.5 PG (ref 26.6–33)
MCHC RBC AUTO-ENTMCNC: 31.1 G/DL (ref 31.5–35.7)
MCV RBC AUTO: 88.4 FL (ref 79–97)
MONOCYTES # BLD AUTO: 0.75 10*3/MM3 (ref 0.1–0.9)
MONOCYTES NFR BLD AUTO: 11.8 % (ref 5–12)
NEUTROPHILS NFR BLD AUTO: 2.74 10*3/MM3 (ref 1.7–7)
NEUTROPHILS NFR BLD AUTO: 43.2 % (ref 42.7–76)
NRBC BLD AUTO-RTO: 0 /100 WBC (ref 0–0.2)
PLATELET # BLD AUTO: 328 10*3/MM3 (ref 140–450)
PMV BLD AUTO: 10 FL (ref 6–12)
POTASSIUM SERPL-SCNC: 4.6 MMOL/L (ref 3.5–5.2)
RBC # BLD AUTO: 4.14 10*6/MM3 (ref 3.77–5.28)
SODIUM SERPL-SCNC: 137 MMOL/L (ref 136–145)
WBC NRBC COR # BLD AUTO: 6.35 10*3/MM3 (ref 3.4–10.8)

## 2025-07-04 PROCEDURE — 25010000002 CEFTRIAXONE PER 250 MG: Performed by: INTERNAL MEDICINE

## 2025-07-04 PROCEDURE — 85025 COMPLETE CBC W/AUTO DIFF WBC: CPT | Performed by: INTERNAL MEDICINE

## 2025-07-04 PROCEDURE — 82948 REAGENT STRIP/BLOOD GLUCOSE: CPT

## 2025-07-04 PROCEDURE — 80048 BASIC METABOLIC PNL TOTAL CA: CPT | Performed by: INTERNAL MEDICINE

## 2025-07-04 RX ADMIN — PANTOPRAZOLE SODIUM 40 MG: 40 TABLET, DELAYED RELEASE ORAL at 05:45

## 2025-07-04 RX ADMIN — LEVOTHYROXINE SODIUM 100 MCG: 0.1 TABLET ORAL at 09:32

## 2025-07-04 RX ADMIN — Medication 10 ML: at 09:32

## 2025-07-04 RX ADMIN — SENNOSIDES AND DOCUSATE SODIUM 2 TABLET: 50; 8.6 TABLET ORAL at 09:32

## 2025-07-04 RX ADMIN — Medication 1000 MCG: at 09:31

## 2025-07-04 RX ADMIN — POLYETHYLENE GLYCOL 3350 17 G: 17 POWDER, FOR SOLUTION ORAL at 09:31

## 2025-07-04 RX ADMIN — Medication 10 ML: at 20:21

## 2025-07-04 RX ADMIN — DEXTROSE MONOHYDRATE 25 G: 25 INJECTION, SOLUTION INTRAVENOUS at 05:45

## 2025-07-04 RX ADMIN — DEXTROSE MONOHYDRATE 25 G: 25 INJECTION, SOLUTION INTRAVENOUS at 16:15

## 2025-07-04 RX ADMIN — CEFTRIAXONE SODIUM 1000 MG: 1 INJECTION, POWDER, FOR SOLUTION INTRAMUSCULAR; INTRAVENOUS at 16:30

## 2025-07-04 RX ADMIN — MODAFINIL 100 MG: 100 TABLET ORAL at 09:31

## 2025-07-04 NOTE — PLAN OF CARE
Problem: Adult Inpatient Plan of Care  Goal: Absence of Hospital-Acquired Illness or Injury  Intervention: Identify and Manage Fall Risk  Recent Flowsheet Documentation  Taken 7/4/2025 1811 by Jo Ann Alfaro RN  Safety Promotion/Fall Prevention:   room organization consistent   safety round/check completed   nonskid shoes/slippers when out of bed   assistive device/personal items within reach   activity supervised  Taken 7/4/2025 1600 by Jo Ann Alfaro RN  Safety Promotion/Fall Prevention:   safety round/check completed   room organization consistent   nonskid shoes/slippers when out of bed   assistive device/personal items within reach   activity supervised  Taken 7/4/2025 1400 by Jo Ann Alfaro RN  Safety Promotion/Fall Prevention:   safety round/check completed   room organization consistent   assistive device/personal items within reach   activity supervised  Taken 7/4/2025 1200 by Jo Ann Alfaro RN  Safety Promotion/Fall Prevention:   nonskid shoes/slippers when out of bed   room organization consistent   safety round/check completed   assistive device/personal items within reach   activity supervised  Taken 7/4/2025 0932 by Jo Ann Alfaro RN  Safety Promotion/Fall Prevention:   safety round/check completed   room organization consistent   assistive device/personal items within reach   activity supervised     Problem: Adult Inpatient Plan of Care  Goal: Absence of Hospital-Acquired Illness or Injury  Intervention: Prevent and Manage VTE (Venous Thromboembolism) Risk  Recent Flowsheet Documentation  Taken 7/4/2025 1400 by Jo Ann Alfaro RN  VTE Prevention/Management: SCDs (sequential compression devices) on     Problem: Adult Inpatient Plan of Care  Goal: Absence of Hospital-Acquired Illness or Injury  Intervention: Prevent Infection  Recent Flowsheet Documentation  Taken 7/4/2025 1811 by Jo Ann Alfaro RN  Infection Prevention: single patient room provided  Taken 7/4/2025 1600 by Jo Ann Alfaro RN  Infection Prevention:  single patient room provided  Taken 7/4/2025 1400 by Jo Ann Alfaro, BRANDON  Infection Prevention: single patient room provided  Taken 7/4/2025 1200 by Jo Ann Alfaro RN  Infection Prevention: single patient room provided  Taken 7/4/2025 0932 by Jo Ann Alfaro RN  Infection Prevention:   hand hygiene promoted   single patient room provided   Goal Outcome Evaluation:              Outcome Evaluation: Patient female 84 years old admitted due to  changed mental status she have episode of low glucose level and today have received twice D5% by IV and need to be feed  and enci=ourage to eat, have bowel movemnt today and change purewick,continue with ABT for cystitis

## 2025-07-04 NOTE — PROGRESS NOTES
Name: Madeleine Tavares ADMIT: 2025   : 1940  PCP: Terese Song MD    MRN: [id number] LOS: 2 days   AGE/SEX: 84 y.o. female  ROOM: [facility]     Subjective   Subjective   Patient seen at bedside.        Objective   Objective   Vital Signs  Temp:  [97.3 °F (36.3 °C)-98.6 °F (37 °C)] 97.3 °F (36.3 °C)  Heart Rate:  [51-64] 51  Resp:  [16-18] 16  BP: (108-124)/(53-96) 124/56  SpO2:  [83 %-100 %] 99 %  on   ;   Device (Oxygen Therapy): room air  Body mass index is 22.14 kg/m².  Physical Exam  Vitals and nursing note reviewed.   Constitutional:       General: She is not in acute distress.     Appearance: She is ill-appearing (chronically). She is not toxic-appearing.   HENT:      Head: Normocephalic and atraumatic.      Nose: Nose normal.      Mouth/Throat:      Mouth: Mucous membranes are moist.      Pharynx: Oropharynx is clear.   Cardiovascular:      Rate and Rhythm: Normal rate and regular rhythm.      Pulses: Normal pulses.   Pulmonary:      Effort: Pulmonary effort is normal.      Breath sounds: Normal breath sounds.   Abdominal:      General: Bowel sounds are normal. There is no distension.      Palpations: Abdomen is soft.      Tenderness: There is no abdominal tenderness.   Musculoskeletal:         General: No swelling or tenderness.      Cervical back: Neck supple.   Skin:     General: Skin is warm and dry.      Capillary Refill: Capillary refill takes less than 2 seconds.   Neurological:      General: No focal deficit present.      Mental Status: She is alert.   Psychiatric:         Cognition and Memory: Cognition is impaired (known dementia).       Results Review     I reviewed the patient's new clinical results.  Results from last 7 days   Lab Units 25  0408 25  0316 25  0323 25  1414   WBC 10*3/mm3 6.35 6.62 5.42 5.76   HEMOGLOBIN g/dL 11.4* 11.6* 11.2* 12.2   PLATELETS 10*3/mm3 328 348 364 381     Results from last 7 days   Lab Units 25  0408 25  0316  07/02/25  0322 07/01/25  1414   SODIUM mmol/L 137 138 137 139   POTASSIUM mmol/L 4.6 4.9 4.4 4.7   CHLORIDE mmol/L 107 107 107 104   CO2 mmol/L 20.0* 21.7* 22.1 25.0   BUN mg/dL 10.0 10.0 12.0 13.0   CREATININE mg/dL 0.96 1.02* 1.01* 1.05*   GLUCOSE mg/dL 76 92 112* 99   EGFR mL/min/1.73 58.5* 54.4* 55.0* 52.8*     Results from last 7 days   Lab Units 07/02/25  0322 07/01/25  1414   ALBUMIN g/dL 3.1* 3.5   BILIRUBIN mg/dL <0.2 0.2   ALK PHOS U/L 66 72   AST (SGOT) U/L 16 18   ALT (SGPT) U/L 10 6     Results from last 7 days   Lab Units 07/04/25  0408 07/03/25  0316 07/02/25  0322 07/01/25  1414   CALCIUM mg/dL 9.2 9.5 9.3 9.7   ALBUMIN g/dL  --   --  3.1* 3.5   MAGNESIUM mg/dL  --   --  2.0  --    PHOSPHORUS mg/dL  --   --  3.1  --        Hemoglobin A1C   Date/Time Value Ref Range Status   07/02/2025 0323 5.90 (H) 4.80 - 5.60 % Final     Glucose   Date/Time Value Ref Range Status   07/04/2025 1059 89 70 - 130 mg/dL Final   07/04/2025 0631 150 (H) 70 - 130 mg/dL Final   07/04/2025 0533 59 (L) 70 - 130 mg/dL Final   07/03/2025 2128 202 (H) 70 - 130 mg/dL Final   07/03/2025 2041 56 (L) 70 - 130 mg/dL Final   07/03/2025 1743 58 (L) 70 - 130 mg/dL Final   07/03/2025 1132 68 (L) 70 - 130 mg/dL Final       No radiology results for the last day    I have personally reviewed all medications:  Scheduled Medications  atenolol, 25 mg, Oral, Daily  cefTRIAXone, 1,000 mg, Intravenous, Q24H  insulin lispro, 2-7 Units, Subcutaneous, 4x Daily AC & at Bedtime  levothyroxine, 100 mcg, Oral, Daily  modafinil, 100 mg, Oral, Daily  pantoprazole, 40 mg, Oral, Q AM  polyethylene glycol, 17 g, Oral, Daily  sennosides-docusate, 2 tablet, Oral, Daily  sodium chloride, 10 mL, Intravenous, Q12H  vitamin B-12, 1,000 mcg, Oral, Daily    Infusions   Diet  Diet: Regular/House; Texture: Mechanical Ground (NDD 2); Fluid Consistency: Thin (IDDSI 0)    I have personally reviewed:  [x]  Laboratory   [x]  Microbiology   [x]  Radiology   [x]   EKG/Telemetry  [x]  Cardiology/Vascular   []  Pathology    []  Records       Assessment/Plan     Active Hospital Problems    Diagnosis  POA    **Acute cystitis without hematuria [N30.00]  Yes    Essential hypertension [I10]  Yes    Metabolic encephalopathy [G93.41]  Yes    Hypoglycemia [E16.2]  Yes    Abnormal CT scan of head [R93.0]  Yes    Alzheimer's disease [G30.9, F02.80]  Yes    Anxiety and depression [F41.9, F32.A]  Yes    UTI due to extended-spectrum beta lactamase (ESBL) producing Escherichia coli recurrent [N39.0, B96.29, Z16.12]  Yes    Anemia, chronic disease [D63.8]  Yes      Resolved Hospital Problems   No resolved problems to display.       84 y.o. female admitted with Acute cystitis without hematuria.    Acute Cystitis without Hematuria  - has hx of ESBL  - urine culture growing E.Coli, sensitivities noted-narrow abx from merrem to rocephin  - Complete Rocephin course     Metabolic Encephalopathy  - due to above, superimposed on dementia  - monitor with above treatment  - monitor for sundowning and redirect as needed     Hypoglycemia  - A1C 5.90%  - monitor on hypoglycemia protocol  - encourage PO and give nutritional supplementation     HTN  - BP acceptable  - continue atenolol     Hypothyroidism  - continue levothyroxine      SCDs for DVT prophylaxis.  Full code.  Discussed with patient and nursing staff.    Expected Discharge Date: 7/6/2025; Expected Discharge Time:       Austin Ruiz MD  [facility]  07/04/25  14:50 EDT

## 2025-07-04 NOTE — NURSING NOTE
Patient have an episode of hypoglycemia 23 given D 5% 50 ml per IV and given orange juice and food patient alert able to eat after give  her D 5% 50 ml, recheck blood glucose and increase 111 will continue monitor.

## 2025-07-05 LAB
ANION GAP SERPL CALCULATED.3IONS-SCNC: 7 MMOL/L (ref 5–15)
BASOPHILS # BLD AUTO: 0.02 10*3/MM3 (ref 0–0.2)
BASOPHILS NFR BLD AUTO: 0.3 % (ref 0–1.5)
BUN SERPL-MCNC: 13 MG/DL (ref 8–23)
BUN/CREAT SERPL: 13.5 (ref 7–25)
CALCIUM SPEC-SCNC: 9.2 MG/DL (ref 8.6–10.5)
CHLORIDE SERPL-SCNC: 104 MMOL/L (ref 98–107)
CO2 SERPL-SCNC: 23 MMOL/L (ref 22–29)
CREAT SERPL-MCNC: 0.96 MG/DL (ref 0.57–1)
DEPRECATED RDW RBC AUTO: 39.6 FL (ref 37–54)
EGFRCR SERPLBLD CKD-EPI 2021: 58.5 ML/MIN/1.73
EOSINOPHIL # BLD AUTO: 0.24 10*3/MM3 (ref 0–0.4)
EOSINOPHIL NFR BLD AUTO: 3.6 % (ref 0.3–6.2)
ERYTHROCYTE [DISTWIDTH] IN BLOOD BY AUTOMATED COUNT: 12.1 % (ref 12.3–15.4)
GLUCOSE BLDC GLUCOMTR-MCNC: 104 MG/DL (ref 70–130)
GLUCOSE BLDC GLUCOMTR-MCNC: 114 MG/DL (ref 70–130)
GLUCOSE BLDC GLUCOMTR-MCNC: 158 MG/DL (ref 70–130)
GLUCOSE BLDC GLUCOMTR-MCNC: 33 MG/DL (ref 70–130)
GLUCOSE BLDC GLUCOMTR-MCNC: 51 MG/DL (ref 70–130)
GLUCOSE BLDC GLUCOMTR-MCNC: 72 MG/DL (ref 70–130)
GLUCOSE BLDC GLUCOMTR-MCNC: 83 MG/DL (ref 70–130)
GLUCOSE BLDC GLUCOMTR-MCNC: 84 MG/DL (ref 70–130)
GLUCOSE SERPL-MCNC: 85 MG/DL (ref 65–99)
HCT VFR BLD AUTO: 35 % (ref 34–46.6)
HGB BLD-MCNC: 11 G/DL (ref 12–15.9)
IMM GRANULOCYTES # BLD AUTO: 0.02 10*3/MM3 (ref 0–0.05)
IMM GRANULOCYTES NFR BLD AUTO: 0.3 % (ref 0–0.5)
LYMPHOCYTES # BLD AUTO: 2.49 10*3/MM3 (ref 0.7–3.1)
LYMPHOCYTES NFR BLD AUTO: 37.4 % (ref 19.6–45.3)
MCH RBC QN AUTO: 27.8 PG (ref 26.6–33)
MCHC RBC AUTO-ENTMCNC: 31.4 G/DL (ref 31.5–35.7)
MCV RBC AUTO: 88.4 FL (ref 79–97)
MONOCYTES # BLD AUTO: 1.03 10*3/MM3 (ref 0.1–0.9)
MONOCYTES NFR BLD AUTO: 15.5 % (ref 5–12)
NEUTROPHILS NFR BLD AUTO: 2.86 10*3/MM3 (ref 1.7–7)
NEUTROPHILS NFR BLD AUTO: 42.9 % (ref 42.7–76)
NRBC BLD AUTO-RTO: 0 /100 WBC (ref 0–0.2)
PLATELET # BLD AUTO: 321 10*3/MM3 (ref 140–450)
PMV BLD AUTO: 10.1 FL (ref 6–12)
POTASSIUM SERPL-SCNC: 4.9 MMOL/L (ref 3.5–5.2)
RBC # BLD AUTO: 3.96 10*6/MM3 (ref 3.77–5.28)
SODIUM SERPL-SCNC: 134 MMOL/L (ref 136–145)
WBC NRBC COR # BLD AUTO: 6.66 10*3/MM3 (ref 3.4–10.8)

## 2025-07-05 PROCEDURE — 85025 COMPLETE CBC W/AUTO DIFF WBC: CPT | Performed by: INTERNAL MEDICINE

## 2025-07-05 PROCEDURE — 82948 REAGENT STRIP/BLOOD GLUCOSE: CPT

## 2025-07-05 PROCEDURE — 25010000002 CEFTRIAXONE PER 250 MG: Performed by: INTERNAL MEDICINE

## 2025-07-05 PROCEDURE — 93010 ELECTROCARDIOGRAM REPORT: CPT | Performed by: INTERNAL MEDICINE

## 2025-07-05 PROCEDURE — 80048 BASIC METABOLIC PNL TOTAL CA: CPT | Performed by: INTERNAL MEDICINE

## 2025-07-05 PROCEDURE — 93005 ELECTROCARDIOGRAM TRACING: CPT | Performed by: INTERNAL MEDICINE

## 2025-07-05 RX ADMIN — ATENOLOL 25 MG: 25 TABLET ORAL at 09:17

## 2025-07-05 RX ADMIN — MODAFINIL 100 MG: 100 TABLET ORAL at 09:14

## 2025-07-05 RX ADMIN — SENNOSIDES AND DOCUSATE SODIUM 2 TABLET: 50; 8.6 TABLET ORAL at 09:14

## 2025-07-05 RX ADMIN — Medication 10 ML: at 21:08

## 2025-07-05 RX ADMIN — Medication 10 ML: at 09:15

## 2025-07-05 RX ADMIN — Medication 1000 MCG: at 09:15

## 2025-07-05 RX ADMIN — LEVOTHYROXINE SODIUM 100 MCG: 0.1 TABLET ORAL at 09:14

## 2025-07-05 RX ADMIN — POLYETHYLENE GLYCOL 3350 17 G: 17 POWDER, FOR SOLUTION ORAL at 09:14

## 2025-07-05 RX ADMIN — PANTOPRAZOLE SODIUM 40 MG: 40 TABLET, DELAYED RELEASE ORAL at 05:15

## 2025-07-05 RX ADMIN — DEXTROSE MONOHYDRATE 25 G: 25 INJECTION, SOLUTION INTRAVENOUS at 21:09

## 2025-07-05 RX ADMIN — CEFTRIAXONE SODIUM 1000 MG: 1 INJECTION, POWDER, FOR SOLUTION INTRAMUSCULAR; INTRAVENOUS at 15:07

## 2025-07-05 NOTE — PLAN OF CARE
Goal Outcome Evaluation:   Pt blood sugars were WNL overnight with supplemental boosts drink given. Pt is unable to self feed and requires assistance. Boost supplemental drink offered q2-3 hrs. Pt drank 5 boosts overnight. Pt had a bowel movement with a partial bath . Pt plan of care ongoing no needs at this time.

## 2025-07-05 NOTE — PLAN OF CARE
Goal Outcome Evaluation:  Plan of Care Reviewed With: patient           Outcome Evaluation: Patient awake, but disoriented x 4.  Blood sugar low at lunch time, but stable for dinner.  Vitals stable, no pain.

## 2025-07-05 NOTE — PROGRESS NOTES
Name: Madeleine Tavares ADMIT: 2025   : 1940  PCP: Terese Song MD    MRN: [id number] LOS: 3 days   AGE/SEX: 84 y.o. female  ROOM: [facility]     Subjective   Subjective   Patient seen at bedside today.       Objective   Objective   Vital Signs  Temp:  [97.3 °F (36.3 °C)-98.6 °F (37 °C)] 97.3 °F (36.3 °C)  Heart Rate:  [47-70] 65  Resp:  [16-18] 18  BP: (104-142)/(48-84) 137/84  SpO2:  [92 %-100 %] 92 %  on   ;   Device (Oxygen Therapy): room air  Body mass index is 22.14 kg/m².  Physical Exam  Vitals and nursing note reviewed.   Constitutional:       General: She is not in acute distress.     Appearance: She is ill-appearing (chronically). She is not toxic-appearing.   HENT:      Head: Normocephalic and atraumatic.      Nose: Nose normal.      Mouth/Throat:      Mouth: Mucous membranes are moist.      Pharynx: Oropharynx is clear.   Cardiovascular:      Rate and Rhythm: Normal rate and regular rhythm.      Pulses: Normal pulses.   Pulmonary:      Effort: Pulmonary effort is normal.      Breath sounds: Normal breath sounds.   Abdominal:      General: Bowel sounds are normal. There is no distension.      Palpations: Abdomen is soft.      Tenderness: There is no abdominal tenderness.   Musculoskeletal:         General: No swelling or tenderness.      Cervical back: Neck supple.   Skin:     General: Skin is warm and dry.      Capillary Refill: Capillary refill takes less than 2 seconds.   Neurological:      General: No focal deficit present.      Mental Status: She is alert.   Psychiatric:         Cognition and Memory: Cognition is impaired (known dementia).     Copied text material from yesterday's note has been reviewed for appropriate changes and remains accurate as of 25.        Results Review     I reviewed the patient's new clinical results.  Results from last 7 days   Lab Units 25  0437 25  0408 25  0316 25  0323   WBC 10*3/mm3 6.66 6.35 6.62 5.42   HEMOGLOBIN g/dL  11.0* 11.4* 11.6* 11.2*   PLATELETS 10*3/mm3 321 328 348 364     Results from last 7 days   Lab Units 07/05/25 0437 07/04/25 0408 07/03/25 0316 07/02/25  0322   SODIUM mmol/L 134* 137 138 137   POTASSIUM mmol/L 4.9 4.6 4.9 4.4   CHLORIDE mmol/L 104 107 107 107   CO2 mmol/L 23.0 20.0* 21.7* 22.1   BUN mg/dL 13.0 10.0 10.0 12.0   CREATININE mg/dL 0.96 0.96 1.02* 1.01*   GLUCOSE mg/dL 85 76 92 112*   EGFR mL/min/1.73 58.5* 58.5* 54.4* 55.0*     Results from last 7 days   Lab Units 07/02/25 0322 07/01/25  1414   ALBUMIN g/dL 3.1* 3.5   BILIRUBIN mg/dL <0.2 0.2   ALK PHOS U/L 66 72   AST (SGOT) U/L 16 18   ALT (SGPT) U/L 10 6     Results from last 7 days   Lab Units 07/05/25 0437 07/04/25 0408 07/03/25 0316 07/02/25 0322 07/01/25  1414   CALCIUM mg/dL 9.2 9.2 9.5 9.3 9.7   ALBUMIN g/dL  --   --   --  3.1* 3.5   MAGNESIUM mg/dL  --   --   --  2.0  --    PHOSPHORUS mg/dL  --   --   --  3.1  --        Glucose   Date/Time Value Ref Range Status   07/05/2025 1533 104 70 - 130 mg/dL Final   07/05/2025 1152 83 70 - 130 mg/dL Final   07/05/2025 1115 51 (L) 70 - 130 mg/dL Final   07/05/2025 0614 114 70 - 130 mg/dL Final   07/05/2025 0417 72 70 - 130 mg/dL Final   07/05/2025 0048 84 70 - 130 mg/dL Final   07/04/2025 2011 96 70 - 130 mg/dL Final       No radiology results for the last day    I have personally reviewed all medications:  Scheduled Medications  atenolol, 25 mg, Oral, Daily  insulin lispro, 2-7 Units, Subcutaneous, 4x Daily AC & at Bedtime  levothyroxine, 100 mcg, Oral, Daily  modafinil, 100 mg, Oral, Daily  pantoprazole, 40 mg, Oral, Q AM  polyethylene glycol, 17 g, Oral, Daily  sennosides-docusate, 2 tablet, Oral, Daily  sodium chloride, 10 mL, Intravenous, Q12H  vitamin B-12, 1,000 mcg, Oral, Daily    Infusions   Diet  Diet: Regular/House; Texture: Mechanical Ground (NDD 2); Fluid Consistency: Thin (IDDSI 0)    I have personally reviewed:  [x]  Laboratory   [x]  Microbiology   [x]  Radiology   [x]   EKG/Telemetry  [x]  Cardiology/Vascular   []  Pathology    []  Records       Assessment/Plan     Active Hospital Problems    Diagnosis  POA    **Acute cystitis without hematuria [N30.00]  Yes    Essential hypertension [I10]  Yes    Metabolic encephalopathy [G93.41]  Yes    Hypoglycemia [E16.2]  Yes    Abnormal CT scan of head [R93.0]  Yes    Alzheimer's disease [G30.9, F02.80]  Yes    Anxiety and depression [F41.9, F32.A]  Yes    UTI due to extended-spectrum beta lactamase (ESBL) producing Escherichia coli recurrent [N39.0, B96.29, Z16.12]  Yes    Anemia, chronic disease [D63.8]  Yes      Resolved Hospital Problems   No resolved problems to display.       84 y.o. female admitted with Acute cystitis without hematuria.    Acute Cystitis without Hematuria  - has hx of ESBL  - urine culture growing E.Coli, sensitivities noted-narrow abx from merrem to rocephin  - Complete Rocephin course     Metabolic Encephalopathy  - due to above, superimposed on dementia  - monitor with above treatment  - monitor for sundowning and redirect as needed     Hypoglycemia  - A1C 5.90%  - monitor on hypoglycemia protocol  - encourage PO and give nutritional supplementation     HTN  - BP acceptable  - continue atenolol     Hypothyroidism  - continue levothyroxine     Arrhythmia noted, cardiology consult.     SCDs for DVT prophylaxis.  Full code.  Discussed with patient and nursing staff.  Expected Discharge Date: 7/6/2025; Expected Discharge Time:       Asutin Ruiz MD  [facility]  07/05/25  18:27 EDT

## 2025-07-06 LAB
ANION GAP SERPL CALCULATED.3IONS-SCNC: 9 MMOL/L (ref 5–15)
BASOPHILS # BLD AUTO: 0.02 10*3/MM3 (ref 0–0.2)
BASOPHILS NFR BLD AUTO: 0.3 % (ref 0–1.5)
BUN SERPL-MCNC: 14 MG/DL (ref 8–23)
BUN/CREAT SERPL: 15.4 (ref 7–25)
CALCIUM SPEC-SCNC: 9.3 MG/DL (ref 8.6–10.5)
CHLORIDE SERPL-SCNC: 105 MMOL/L (ref 98–107)
CO2 SERPL-SCNC: 23 MMOL/L (ref 22–29)
CREAT SERPL-MCNC: 0.91 MG/DL (ref 0.57–1)
DEPRECATED RDW RBC AUTO: 40.5 FL (ref 37–54)
EGFRCR SERPLBLD CKD-EPI 2021: 62.3 ML/MIN/1.73
EOSINOPHIL # BLD AUTO: 0.28 10*3/MM3 (ref 0–0.4)
EOSINOPHIL NFR BLD AUTO: 4.9 % (ref 0.3–6.2)
ERYTHROCYTE [DISTWIDTH] IN BLOOD BY AUTOMATED COUNT: 12.6 % (ref 12.3–15.4)
GLUCOSE BLDC GLUCOMTR-MCNC: 101 MG/DL (ref 70–130)
GLUCOSE BLDC GLUCOMTR-MCNC: 136 MG/DL (ref 70–130)
GLUCOSE BLDC GLUCOMTR-MCNC: 29 MG/DL (ref 70–130)
GLUCOSE BLDC GLUCOMTR-MCNC: 77 MG/DL (ref 70–130)
GLUCOSE BLDC GLUCOMTR-MCNC: 78 MG/DL (ref 70–130)
GLUCOSE BLDC GLUCOMTR-MCNC: 80 MG/DL (ref 70–130)
GLUCOSE BLDC GLUCOMTR-MCNC: 93 MG/DL (ref 70–130)
GLUCOSE SERPL-MCNC: 89 MG/DL (ref 65–99)
HCT VFR BLD AUTO: 35.9 % (ref 34–46.6)
HGB BLD-MCNC: 11.3 G/DL (ref 12–15.9)
IMM GRANULOCYTES # BLD AUTO: 0.01 10*3/MM3 (ref 0–0.05)
IMM GRANULOCYTES NFR BLD AUTO: 0.2 % (ref 0–0.5)
LYMPHOCYTES # BLD AUTO: 2.31 10*3/MM3 (ref 0.7–3.1)
LYMPHOCYTES NFR BLD AUTO: 40.2 % (ref 19.6–45.3)
MAGNESIUM SERPL-MCNC: 1.9 MG/DL (ref 1.6–2.4)
MCH RBC QN AUTO: 27.4 PG (ref 26.6–33)
MCHC RBC AUTO-ENTMCNC: 31.5 G/DL (ref 31.5–35.7)
MCV RBC AUTO: 86.9 FL (ref 79–97)
MONOCYTES # BLD AUTO: 0.68 10*3/MM3 (ref 0.1–0.9)
MONOCYTES NFR BLD AUTO: 11.8 % (ref 5–12)
NEUTROPHILS NFR BLD AUTO: 2.45 10*3/MM3 (ref 1.7–7)
NEUTROPHILS NFR BLD AUTO: 42.6 % (ref 42.7–76)
NRBC BLD AUTO-RTO: 0 /100 WBC (ref 0–0.2)
PHOSPHATE SERPL-MCNC: 3.1 MG/DL (ref 2.5–4.5)
PLATELET # BLD AUTO: 327 10*3/MM3 (ref 140–450)
PMV BLD AUTO: 9.7 FL (ref 6–12)
POTASSIUM SERPL-SCNC: 4.6 MMOL/L (ref 3.5–5.2)
QT INTERVAL: 397 MS
QTC INTERVAL: 422 MS
RBC # BLD AUTO: 4.13 10*6/MM3 (ref 3.77–5.28)
SODIUM SERPL-SCNC: 137 MMOL/L (ref 136–145)
WBC NRBC COR # BLD AUTO: 5.75 10*3/MM3 (ref 3.4–10.8)

## 2025-07-06 PROCEDURE — 80048 BASIC METABOLIC PNL TOTAL CA: CPT | Performed by: INTERNAL MEDICINE

## 2025-07-06 PROCEDURE — 84100 ASSAY OF PHOSPHORUS: CPT | Performed by: INTERNAL MEDICINE

## 2025-07-06 PROCEDURE — 85025 COMPLETE CBC W/AUTO DIFF WBC: CPT | Performed by: INTERNAL MEDICINE

## 2025-07-06 PROCEDURE — 99221 1ST HOSP IP/OBS SF/LOW 40: CPT | Performed by: INTERNAL MEDICINE

## 2025-07-06 PROCEDURE — 82948 REAGENT STRIP/BLOOD GLUCOSE: CPT

## 2025-07-06 PROCEDURE — 83735 ASSAY OF MAGNESIUM: CPT | Performed by: INTERNAL MEDICINE

## 2025-07-06 RX ADMIN — Medication 10 ML: at 09:41

## 2025-07-06 RX ADMIN — MODAFINIL 100 MG: 100 TABLET ORAL at 09:41

## 2025-07-06 RX ADMIN — LEVOTHYROXINE SODIUM 100 MCG: 0.1 TABLET ORAL at 09:41

## 2025-07-06 RX ADMIN — Medication 10 ML: at 22:01

## 2025-07-06 RX ADMIN — PANTOPRAZOLE SODIUM 40 MG: 40 TABLET, DELAYED RELEASE ORAL at 07:05

## 2025-07-06 RX ADMIN — POLYETHYLENE GLYCOL 3350 17 G: 17 POWDER, FOR SOLUTION ORAL at 09:40

## 2025-07-06 RX ADMIN — SENNOSIDES AND DOCUSATE SODIUM 2 TABLET: 50; 8.6 TABLET ORAL at 09:41

## 2025-07-06 RX ADMIN — ATENOLOL 25 MG: 25 TABLET ORAL at 09:41

## 2025-07-06 RX ADMIN — Medication 1000 MCG: at 09:40

## 2025-07-06 RX ADMIN — DEXTROSE MONOHYDRATE 25 G: 25 INJECTION, SOLUTION INTRAVENOUS at 08:13

## 2025-07-06 NOTE — PLAN OF CARE
Goal Outcome Evaluation:  Plan of Care Reviewed With: patient           Outcome Evaluation: Blood sugars still continue to run low intermittently.  Glucose of 29 at breakfast time this morning.  otherwise vitals stable.  patient in sinus caio with first degree avb.  no changes.

## 2025-07-06 NOTE — PLAN OF CARE
Goal Outcome Evaluation:  Plan of Care Reviewed With: patient        Progress: no change  Outcome Evaluation: vitals stable.  pt did not express any pain and did not appear to be in any pain.  meds given per orders.  external catheter in place.  blood glucose was low.  PRN med given.  blood glucose returned to normal limits.  skin breakdown noted.  wound consult placed.  will continue to monitor.

## 2025-07-06 NOTE — PROGRESS NOTES
Name: Madeleine Tavares ADMIT: 2025   : 1940  PCP: Terese Song MD    MRN: [id number] LOS: 4 days   AGE/SEX: 84 y.o. female  ROOM: [facility]     Subjective   Subjective   Patient is seen at bedside, no new complaints.       Objective   Objective   Vital Signs  Temp:  [96.1 °F (35.6 °C)-97.3 °F (36.3 °C)] 97.3 °F (36.3 °C)  Heart Rate:  [54-66] 54  Resp:  [16-18] 18  BP: (110-125)/(45-69) 110/45  SpO2:  [91 %-99 %] 91 %  on   ;   Device (Oxygen Therapy): room air  Body mass index is 22.14 kg/m².  Physical Exam  Vitals and nursing note reviewed.   Constitutional:       General: She is not in acute distress.     Appearance: She is ill-appearing (chronically). She is not toxic-appearing.   HENT:      Head: Normocephalic and atraumatic.      Nose: Nose normal.      Mouth/Throat:      Mouth: Mucous membranes are moist.      Pharynx: Oropharynx is clear.   Cardiovascular:      Rate and Rhythm: Normal rate and regular rhythm.      Pulses: Normal pulses.   Pulmonary:      Effort: Pulmonary effort is normal.      Breath sounds: Normal breath sounds.   Abdominal:      General: Bowel sounds are normal. There is no distension.      Palpations: Abdomen is soft.      Tenderness: There is no abdominal tenderness.   Musculoskeletal:         General: No swelling or tenderness.      Cervical back: Neck supple.   Skin:     General: Skin is warm and dry.      Capillary Refill: Capillary refill takes less than 2 seconds.   Neurological:      General: No focal deficit present.      Mental Status: She is alert.   Psychiatric:         Cognition and Memory: Cognition is impaired (known dementia).      Copied text material from yesterday's note has been reviewed for appropriate changes and remains accurate as of 25.        Results Review     I reviewed the patient's new clinical results.  Results from last 7 days   Lab Units 25  0409 25  0437 25  0408 25  0316   WBC 10*3/mm3 5.75 6.66 6.35 6.62    HEMOGLOBIN g/dL 11.3* 11.0* 11.4* 11.6*   PLATELETS 10*3/mm3 327 321 328 348     Results from last 7 days   Lab Units 07/06/25  0409 07/05/25 0437 07/04/25 0408 07/03/25  0316   SODIUM mmol/L 137 134* 137 138   POTASSIUM mmol/L 4.6 4.9 4.6 4.9   CHLORIDE mmol/L 105 104 107 107   CO2 mmol/L 23.0 23.0 20.0* 21.7*   BUN mg/dL 14.0 13.0 10.0 10.0   CREATININE mg/dL 0.91 0.96 0.96 1.02*   GLUCOSE mg/dL 89 85 76 92   EGFR mL/min/1.73 62.3 58.5* 58.5* 54.4*     Results from last 7 days   Lab Units 07/02/25  0322 07/01/25  1414   ALBUMIN g/dL 3.1* 3.5   BILIRUBIN mg/dL <0.2 0.2   ALK PHOS U/L 66 72   AST (SGOT) U/L 16 18   ALT (SGPT) U/L 10 6     Results from last 7 days   Lab Units 07/06/25  0409 07/05/25  0437 07/04/25  0408 07/03/25  0316 07/02/25  0322 07/01/25  1414   CALCIUM mg/dL 9.3 9.2 9.2 9.5 9.3 9.7   ALBUMIN g/dL  --   --   --   --  3.1* 3.5   MAGNESIUM mg/dL 1.9  --   --   --  2.0  --    PHOSPHORUS mg/dL 3.1  --   --   --  3.1  --        Glucose   Date/Time Value Ref Range Status   07/06/2025 1617 93 70 - 130 mg/dL Final   07/06/2025 1111 80 70 - 130 mg/dL Final   07/06/2025 0833 101 70 - 130 mg/dL Final   07/06/2025 0804 29 (C) 70 - 130 mg/dL Final   07/06/2025 0404 78 70 - 130 mg/dL Final   07/06/2025 0001 77 70 - 130 mg/dL Final   07/05/2025 2143 158 (H) 70 - 130 mg/dL Final       No radiology results for the last day    I have personally reviewed all medications:  Scheduled Medications  atenolol, 25 mg, Oral, Daily  insulin lispro, 2-7 Units, Subcutaneous, 4x Daily AC & at Bedtime  levothyroxine, 100 mcg, Oral, Daily  pantoprazole, 40 mg, Oral, Q AM  polyethylene glycol, 17 g, Oral, Daily  sennosides-docusate, 2 tablet, Oral, Daily  sodium chloride, 10 mL, Intravenous, Q12H  vitamin B-12, 1,000 mcg, Oral, Daily    Infusions   Diet  Diet: Regular/House; Texture: Mechanical Ground (NDD 2); Fluid Consistency: Thin (IDDSI 0)    I have personally reviewed:  [x]  Laboratory   [x]  Microbiology   [x]   Radiology   [x]  EKG/Telemetry  [x]  Cardiology/Vascular   []  Pathology    []  Records       Assessment/Plan     Active Hospital Problems    Diagnosis  POA    **Acute cystitis without hematuria [N30.00]  Yes    Essential hypertension [I10]  Yes    Metabolic encephalopathy [G93.41]  Yes    Hypoglycemia [E16.2]  Yes    Abnormal CT scan of head [R93.0]  Yes    Alzheimer's disease [G30.9, F02.80]  Yes    Anxiety and depression [F41.9, F32.A]  Yes    UTI due to extended-spectrum beta lactamase (ESBL) producing Escherichia coli recurrent [N39.0, B96.29, Z16.12]  Yes    Anemia, chronic disease [D63.8]  Yes      Resolved Hospital Problems   No resolved problems to display.       84 y.o. female admitted with Acute cystitis without hematuria.    Acute Cystitis without Hematuria  - has hx of ESBL  - urine culture growing E.Coli, sensitivities noted-narrow abx from merrem to rocephin  - She has completed Rocephin course.     Metabolic Encephalopathy  - due to above, superimposed on dementia  - monitor with above treatment  - monitor for sundowning and redirect as needed     Hypoglycemia  - A1C 5.90%  - monitor on hypoglycemia protocol  - encourage PO and give nutritional supplementation     HTN  - BP acceptable  - continue atenolol     Hypothyroidism  - continue levothyroxine      Arrhythmia noted, cardiology consult.  She is deemed not a candidate for any further workup.     SCDs for DVT prophylaxis.  Full code.  Discussed with patient and nursing staff.      Austin Ruiz MD  [facility]  07/06/25  16:23 EDT

## 2025-07-06 NOTE — CONSULTS
[facility]  Cardiology Consult Note    Patient Identification:  Name: Madeleine Tavares  Age: 84 y.o.  Sex: female  :  1940  MRN: [id number]             Requesting Physician: Dr. Cuadra    Reason for Consultation / Chief Complaint: management recommendations first-degree AV block    History of Present Illness:   84-year-old female has been admitted with altered mental status dementia anxiety depression was found to have a first-degree AV block    Comorbid cardiac risk factors:     Past Medical History:  Past Medical History:   Diagnosis Date    Abdominal hernia without obstruction and without gangrene     Altered mental state     Alzheimer disease     Anemia     Anxiety     Dementia     Depression     Diaphragmatic hernia     Difficulty walking     Diverticulitis large intestine w/o perforation or abscess w/o bleeding     ESBL (extended spectrum beta-lactamase) producing bacteria infection     Falls     Gastritis     GI bleed     Hypertension     Karli     Narcolepsy due to underlying condition without cataplexy     UTI (urinary tract infection)      Past Surgical History:  Past Surgical History:   Procedure Laterality Date    CHOLECYSTECTOMY  08/15/2008    CYSTOSCOPY      multiple    HEMORROIDECTOMY      LAPAROSCOPIC HYSTERECTOMY  2000    VENA CAVA FILTER INSERTION        Allergies:  Allergies   Allergen Reactions    Levaquin [Levofloxacin]     Macrobid [Nitrofurantoin Monohyd Macro]     Penicillins     Sulfa Antibiotics      Home Meds:  Medications Prior to Admission   Medication Sig Dispense Refill Last Dose/Taking    acetaminophen (TYLENOL) 325 MG tablet Take 2 tablets by mouth Every 4 (Four) Hours As Needed for mild pain (1-3).  0 Taking As Needed    albuterol sulfate  (90 Base) MCG/ACT inhaler Inhale 2 puffs Every 6 (Six) Hours As Needed for Wheezing.   Taking As Needed    atenolol (TENORMIN) 25 MG tablet Take 1 tablet by mouth Daily.   Taking    bisacodyl (Dulcolax) 10  "MG suppository Insert 1 suppository into the rectum As Needed for Constipation.   Taking As Needed    bumetanide (BUMEX) 0.5 MG tablet Take 1 tablet by mouth Daily.   Taking    citric acid-potassium citrate (POLYCITRA) 1100-334 MG/5ML solution Take 10 mL by mouth Daily.   Taking    guaifenesin (ROBITUSSIN) 100 MG/5ML liquid Take 10 mL by mouth 3 (Three) Times a Day As Needed for Cough.   Taking As Needed    IRON PO Take 325 mg by mouth Daily.   Taking    lactulose (CHRONULAC) 10 GM/15ML solution Take 30 mL by mouth 2 (Two) Times a Day As Needed.   Taking As Needed    levothyroxine (SYNTHROID, LEVOTHROID) 75 MCG tablet Take 1 tablet by mouth Daily.   Taking    magnesium hydroxide (MILK OF MAGNESIA) 400 MG/5ML suspension Take 5 mL by mouth Daily As Needed.   Taking As Needed    modafinil (PROVIGIL) 100 MG tablet Take 1 tablet by mouth Daily.   Taking    omeprazole (prilOSEC) 10 MG capsule Take 1 capsule by mouth Daily.   Taking    polyethylene glycol (MIRALAX) 17 g packet Take 17 g by mouth Daily.   Taking    sennosides-docusate (senna-docusate sodium) 8.6-50 MG per tablet Take 2 tablets by mouth Daily.   Taking    vitamin B-12 (CYANOCOBALAMIN) 1000 MCG tablet Take 1 tablet by mouth Daily.   Taking     Current Meds:   [unfilled]  Social History:   Social History     Tobacco Use    Smoking status: Former     Current packs/day: 0.00     Average packs/day: 1 pack/day for 15.0 years (15.0 ttl pk-yrs)     Types: Cigarettes     Start date:      Quit date:      Years since quittin.5    Smokeless tobacco: Not on file    Tobacco comments:     EX-SMOKER   Substance Use Topics    Alcohol use: No      Family History:  Family History   Problem Relation Age of Onset    Diabetes Other     Coronary artery disease Other         Review of Systems  Unable to obtain    /52 (BP Location: Left arm, Patient Position: Lying)   Pulse 55   Temp 97.3 °F (36.3 °C) (Oral)   Resp 16   Ht 175.3 cm (69\")   Wt 68 kg (149 lb 14.6 " oz)   SpO2 98%   BMI 22.14 kg/m²   General appearance: No acute changes   Neck: Trachea midline; NECK, supple, no thyromegaly or lymphadenopathy   Lungs: Normal size and shape, normal breath sounds, equal distribution of air, no rales and rhonchi   CV: S1-S2 regular, no murmurs, no rub, no gallop   Abdomen: Soft, nontender; no masses , no abnormal abdominal sounds   Extremities: No deformity , normal color , no peripheral edema   Skin: Normal temperature, turgor and texture; no rash, ulcers                  Cardiographics  ECG:     Telemetry:    Echocardiogram:     Imaging  Chest X-ray:     Lab Review   Results from last 7 days   Lab Units 07/01/25  1737 07/01/25  1414   HSTROP T ng/L 15* 15*     Results from last 7 days   Lab Units 07/06/25  0409   MAGNESIUM mg/dL 1.9     Results from last 7 days   Lab Units 07/06/25  0409   SODIUM mmol/L 137   POTASSIUM mmol/L 4.6   BUN mg/dL 14.0   CREATININE mg/dL 0.91   CALCIUM mg/dL 9.3     @LABRCNTIPbnp@  Results from last 7 days   Lab Units 07/06/25  0409 07/05/25  0437 07/04/25  0408   WBC 10*3/mm3 5.75 6.66 6.35   HEMOGLOBIN g/dL 11.3* 11.0* 11.4*   HEMATOCRIT % 35.9 35.0 36.6   PLATELETS 10*3/mm3 327 321 328             Assessment:  First-degree AV block asymptomatic  UTI  Alzheimer  Dementia  Hypertension  Recommendations / Plan:   84-year-old female admitted with altered mental status metabolic encephalopathy hypertension anxiety depression Alzheimer    Patient was found with a first-degree AV block completely asymptomatic, vital signs are normal, not a great candidate for any further cardiac workup at this point we will continue to observe    Repeat EKG and troponin in the morning    Check the echocardiogram in the morning    Will avoid any further cardiac workup    Ashlee Hurtado MD  7/6/2025, 08:58 EDT      EMR Dragon/Transcription:   Dictated utilizing Dragon dictation

## 2025-07-07 LAB
ANION GAP SERPL CALCULATED.3IONS-SCNC: 9 MMOL/L (ref 5–15)
BASOPHILS # BLD AUTO: 0.02 10*3/MM3 (ref 0–0.2)
BASOPHILS NFR BLD AUTO: 0.3 % (ref 0–1.5)
BUN SERPL-MCNC: 21 MG/DL (ref 8–23)
BUN/CREAT SERPL: 20.2 (ref 7–25)
CALCIUM SPEC-SCNC: 9.1 MG/DL (ref 8.6–10.5)
CHLORIDE SERPL-SCNC: 104 MMOL/L (ref 98–107)
CO2 SERPL-SCNC: 23 MMOL/L (ref 22–29)
CREAT SERPL-MCNC: 1.04 MG/DL (ref 0.57–1)
DEPRECATED RDW RBC AUTO: 39.4 FL (ref 37–54)
EGFRCR SERPLBLD CKD-EPI 2021: 53.1 ML/MIN/1.73
EOSINOPHIL # BLD AUTO: 0.25 10*3/MM3 (ref 0–0.4)
EOSINOPHIL NFR BLD AUTO: 3.8 % (ref 0.3–6.2)
ERYTHROCYTE [DISTWIDTH] IN BLOOD BY AUTOMATED COUNT: 12.4 % (ref 12.3–15.4)
GLUCOSE BLDC GLUCOMTR-MCNC: 111 MG/DL (ref 70–130)
GLUCOSE BLDC GLUCOMTR-MCNC: 111 MG/DL (ref 70–130)
GLUCOSE BLDC GLUCOMTR-MCNC: 140 MG/DL (ref 70–130)
GLUCOSE BLDC GLUCOMTR-MCNC: 97 MG/DL (ref 70–130)
GLUCOSE BLDC GLUCOMTR-MCNC: 98 MG/DL (ref 70–130)
GLUCOSE SERPL-MCNC: 101 MG/DL (ref 65–99)
HCT VFR BLD AUTO: 33.6 % (ref 34–46.6)
HGB BLD-MCNC: 10.6 G/DL (ref 12–15.9)
IMM GRANULOCYTES # BLD AUTO: 0.01 10*3/MM3 (ref 0–0.05)
IMM GRANULOCYTES NFR BLD AUTO: 0.2 % (ref 0–0.5)
LYMPHOCYTES # BLD AUTO: 2.95 10*3/MM3 (ref 0.7–3.1)
LYMPHOCYTES NFR BLD AUTO: 45 % (ref 19.6–45.3)
MCH RBC QN AUTO: 27.5 PG (ref 26.6–33)
MCHC RBC AUTO-ENTMCNC: 31.5 G/DL (ref 31.5–35.7)
MCV RBC AUTO: 87.3 FL (ref 79–97)
MONOCYTES # BLD AUTO: 0.72 10*3/MM3 (ref 0.1–0.9)
MONOCYTES NFR BLD AUTO: 11 % (ref 5–12)
NEUTROPHILS NFR BLD AUTO: 2.6 10*3/MM3 (ref 1.7–7)
NEUTROPHILS NFR BLD AUTO: 39.7 % (ref 42.7–76)
NRBC BLD AUTO-RTO: 0 /100 WBC (ref 0–0.2)
PLATELET # BLD AUTO: 339 10*3/MM3 (ref 140–450)
PMV BLD AUTO: 10.2 FL (ref 6–12)
POTASSIUM SERPL-SCNC: 5 MMOL/L (ref 3.5–5.2)
RBC # BLD AUTO: 3.85 10*6/MM3 (ref 3.77–5.28)
SODIUM SERPL-SCNC: 136 MMOL/L (ref 136–145)
WBC NRBC COR # BLD AUTO: 6.55 10*3/MM3 (ref 3.4–10.8)

## 2025-07-07 PROCEDURE — 82948 REAGENT STRIP/BLOOD GLUCOSE: CPT

## 2025-07-07 PROCEDURE — 85025 COMPLETE CBC W/AUTO DIFF WBC: CPT | Performed by: INTERNAL MEDICINE

## 2025-07-07 PROCEDURE — 80048 BASIC METABOLIC PNL TOTAL CA: CPT | Performed by: INTERNAL MEDICINE

## 2025-07-07 PROCEDURE — 99232 SBSQ HOSP IP/OBS MODERATE 35: CPT

## 2025-07-07 RX ADMIN — POLYETHYLENE GLYCOL 3350 17 G: 17 POWDER, FOR SOLUTION ORAL at 09:17

## 2025-07-07 RX ADMIN — PANTOPRAZOLE SODIUM 40 MG: 40 TABLET, DELAYED RELEASE ORAL at 09:17

## 2025-07-07 RX ADMIN — LEVOTHYROXINE SODIUM 100 MCG: 0.1 TABLET ORAL at 09:16

## 2025-07-07 RX ADMIN — Medication 1000 MCG: at 09:16

## 2025-07-07 RX ADMIN — Medication 10 ML: at 21:31

## 2025-07-07 RX ADMIN — SENNOSIDES AND DOCUSATE SODIUM 2 TABLET: 50; 8.6 TABLET ORAL at 09:16

## 2025-07-07 RX ADMIN — Medication 10 ML: at 09:17

## 2025-07-07 NOTE — PROGRESS NOTES
[facility]  Cardiology Progress Note    Patient Identification:  Name: Madeleine Tavares  Age: 84 y.o.  Sex: female  :  1940  MRN: [id number]                 Follow Up / Chief Complaint: Follow up for bradycardia    Interval History: resting in bed, no complaints      Objective:    Past Medical History:  Past Medical History:   Diagnosis Date    Abdominal hernia without obstruction and without gangrene     Altered mental state     Alzheimer disease     Anemia     Anxiety     Dementia     Depression     Diaphragmatic hernia     Difficulty walking     Diverticulitis large intestine w/o perforation or abscess w/o bleeding     ESBL (extended spectrum beta-lactamase) producing bacteria infection     Falls     Gastritis     GI bleed     Hypertension     Karli     Narcolepsy due to underlying condition without cataplexy     UTI (urinary tract infection)      Past Surgical History:  Past Surgical History:   Procedure Laterality Date    CHOLECYSTECTOMY  08/15/2008    CYSTOSCOPY      multiple    HEMORROIDECTOMY      LAPAROSCOPIC HYSTERECTOMY  2000    VENA CAVA FILTER INSERTION          Social History:   Social History     Tobacco Use    Smoking status: Former     Current packs/day: 0.00     Average packs/day: 1 pack/day for 15.0 years (15.0 ttl pk-yrs)     Types: Cigarettes     Start date:      Quit date:      Years since quittin.5    Smokeless tobacco: Not on file    Tobacco comments:     EX-SMOKER   Substance Use Topics    Alcohol use: No      Family History:  Family History   Problem Relation Age of Onset    Diabetes Other     Coronary artery disease Other           Allergies:  Allergies   Allergen Reactions    Levaquin [Levofloxacin]     Macrobid [Nitrofurantoin Monohyd Macro]     Penicillins     Sulfa Antibiotics      Scheduled Meds:  atenolol, 25 mg, Daily  insulin lispro, 2-7 Units, 4x Daily AC & at Bedtime  levothyroxine, 100 mcg, Daily  pantoprazole, 40 mg, Q  AM  polyethylene glycol, 17 g, Daily  sennosides-docusate, 2 tablet, Daily  sodium chloride, 10 mL, Q12H  vitamin B-12, 1,000 mcg, Daily            INTAKE AND OUTPUT:    Intake/Output Summary (Last 24 hours) at 7/7/2025 1209  Last data filed at 7/7/2025 0918  Gross per 24 hour   Intake 480 ml   Output 900 ml   Net -420 ml       Review of Systems:   GI: no n/v or abd pain  Cardiac: no chest pain or palpitations  Pulmonary: no shortness of breath or cough        Constitutional:  Temp:  [97.3 °F (36.3 °C)-98.2 °F (36.8 °C)] 97.5 °F (36.4 °C)  Heart Rate:  [53-66] 59  Resp:  [18] 18  BP: ()/(45-62) 98/54    Physical Exam:  General:  Alert, cooperative, appears in no acute distress  Respiratory: Clear to auscultation.  Normal respiratory effort and rate.  Cardiovascular: S1S2 Regular rate and rhythm. No murmur, rub or gallop.   Gastrointestinal: soft, non tender. Bowel sounds present.   Extremities: RAMIREZ x4. No pretibial pitting edema. Adequate musculoskeletal strength.   Neuro: alert CN II-XII grossly intact          Cardiographics  Telemetry:     Echocardiogram: pending    Lab Review   Results from last 7 days   Lab Units 07/01/25  1737 07/01/25  1414   HSTROP T ng/L 15* 15*     Results from last 7 days   Lab Units 07/06/25  0409   MAGNESIUM mg/dL 1.9     Results from last 7 days   Lab Units 07/07/25  0338   SODIUM mmol/L 136   POTASSIUM mmol/L 5.0   BUN mg/dL 21.0   CREATININE mg/dL 1.04*   CALCIUM mg/dL 9.1     @LABRCNTIPbnp@  Results from last 7 days   Lab Units 07/07/25  0338 07/06/25  0409 07/05/25  0437   WBC 10*3/mm3 6.55 5.75 6.66   HEMOGLOBIN g/dL 10.6* 11.3* 11.0*   HEMATOCRIT % 33.6* 35.9 35.0   PLATELETS 10*3/mm3 339 327 321             Assessment/plan:  1st degree AV block-asymptomatic. Will stop atenolol, it was held this am for low bp  UTI  Alzheimer's/dementia  Hypertension-BP soft, stopping atenolol    Stop atenolol  Echo pending  Not a great candidate for further workup      )7/7/2025  Vashti HUMMEL  "ITZEL Lazo/Transcription:   \"Dictated utilizing Dragon dictation\".     "

## 2025-07-07 NOTE — PLAN OF CARE
Problem: Adult Inpatient Plan of Care  Goal: Absence of Hospital-Acquired Illness or Injury  Intervention: Identify and Manage Fall Risk  Recent Flowsheet Documentation  Taken 7/7/2025 1800 by Jo Ann Alfaro RN  Safety Promotion/Fall Prevention:   activity supervised   room organization consistent   safety round/check completed  Taken 7/7/2025 1600 by Jo Ann Alfaro RN  Safety Promotion/Fall Prevention:   safety round/check completed   room organization consistent   assistive device/personal items within reach   activity supervised  Taken 7/7/2025 0918 by Jo Ann Alfaro RN  Safety Promotion/Fall Prevention:   room organization consistent   safety round/check completed   activity supervised   assistive device/personal items within reach     Problem: Adult Inpatient Plan of Care  Goal: Absence of Hospital-Acquired Illness or Injury  Intervention: Prevent Skin Injury  Recent Flowsheet Documentation  Taken 7/7/2025 1800 by Jo Ann Alfaro RN  Body Position:   turned   supine   legs elevated   heels elevated  Taken 7/7/2025 1600 by Jo Ann Alfaro RN  Body Position:   turned   left  Taken 7/7/2025 0918 by Jo Ann Alfaro RN  Body Position:   lower extremity elevated   heels elevated   turned   supine     Problem: Adult Inpatient Plan of Care  Goal: Absence of Hospital-Acquired Illness or Injury  Intervention: Prevent Infection  Recent Flowsheet Documentation  Taken 7/7/2025 1800 by Jo Ann Alfaro RN  Infection Prevention:   single patient room provided   hand hygiene promoted  Taken 7/7/2025 1600 by Jo Ann Alfaro RN  Infection Prevention: single patient room provided  Taken 7/7/2025 0918 by Jo Ann Alfaro RN  Infection Prevention: rest/sleep promoted   Goal Outcome Evaluation:           Progress: no change  Outcome Evaluation: Patient female 84 years old admitted with DX acute cystitistreated with ABT ,disoriented x3 open her eyes responde to her name Sinus bradycardia  low B/P hold atenolol then cardiology discontinue  continue checking  blood glucose ,have smear bowel movement today incontinent B&B change position and change bed as nurse from Wound recomended will  continue monitor

## 2025-07-07 NOTE — PROGRESS NOTES
Patient Name: Madeleine Tavares  YOB: 1940  MRN: [id number]  Admission date: 7/1/2025  Reason for Encounter: Follow-up/Progress Note      [facility] Clinical Nutrition Progress Note       Nutrition Intervention Updates: Continue Boost BID to aid with intake- Boost Original BID (Provides 480 kcals, 20 g protein if consumed).     Monitor and encourage good PO intake.      RD to follow up per protocol.      Subjective: 83 y.o. female with a medical history of anemia, diabetes, hypersomnia, narcolepsy who presented to the ED c/o acute altered mental status.      7/3: Weight difference of 70# on 7/1. Asked RN for updated weight. Attempted pt visit, unable to understand speech and pt very confused.     7/7: Pt status remains consistent with previous RD visit. Original weight stated on 7/1 is likely incorrect due to patient current altered mental status. Per pt nurse, drinking the ONS well with assistance.       PO Diet: Diet: Regular/House; Texture: Mechanical Ground (NDD 2); Fluid Consistency: Thin (IDDSI 0)   PO Supplements: Boost Original BID   PO Intake:  Total feeding assistance provided, intake ~25%       Labs: Reviewed - glucose (140), Hemoglobin (10.6)       GI Function:  Fecal incontinence, last bowel movement 7/6        Brief Weight Review:    Wt Readings from Last 1 Encounters:   07/04/25 0500 68 kg (149 lb 14.6 oz)   07/01/25 1908 67.5 kg (148 lb 13 oz)   07/01/25 1407 102 kg (224 lb 13.9 oz)        Results from last 7 days   Lab Units 07/07/25  0338 07/06/25  0409 07/05/25  0437 07/03/25  0316 07/02/25  0322 07/01/25  1414   SODIUM mmol/L 136 137 134*   < > 137 139   POTASSIUM mmol/L 5.0 4.6 4.9   < > 4.4 4.7   CHLORIDE mmol/L 104 105 104   < > 107 104   CO2 mmol/L 23.0 23.0 23.0   < > 22.1 25.0   BUN mg/dL 21.0 14.0 13.0   < > 12.0 13.0   CREATININE mg/dL 1.04* 0.91 0.96   < > 1.01* 1.05*   CALCIUM mg/dL 9.1 9.3 9.2   < > 9.3 9.7   BILIRUBIN mg/dL  --   --   --   --  <0.2 0.2   ALK PHOS U/L   --   --   --   --  66 72   ALT (SGPT) U/L  --   --   --   --  10 6   AST (SGOT) U/L  --   --   --   --  16 18   GLUCOSE mg/dL 101* 89 85   < > 112* 99    < > = values in this interval not displayed.     Results from last 7 days   Lab Units 07/07/25  0338 07/06/25  0409 07/02/25  0323 07/02/25  0322   MAGNESIUM mg/dL  --  1.9  --  2.0   PHOSPHORUS mg/dL  --  3.1  --  3.1   PLATELETS 10*3/mm3 339 327   < >  --    HEMOGLOBIN g/dL 10.6* 11.3*   < >  --    HEMATOCRIT % 33.6* 35.9   < >  --     < > = values in this interval not displayed.     Lab Results   Component Value Date    HGBA1C 5.90 (H) 07/02/2025       Electronically signed by:  Charo Fajardo RD  07/07/25 10:30 EDT

## 2025-07-07 NOTE — NURSING NOTE
Reason for Visit: WOC Team consult for bilateral feet.     Reviewed chart and assessed patient's feet. Noted that there are multiple areas that present as DTI evolving into areas of firm, dry eschar. All areas are without drainage or s/s infection. No topical treatment needed except to keep dry and reduce pressure. Patient unable to give history of the areas to her feet. These do not present as new DTI as they are appear to be evolving towards eschar. The left heel is very dry thick eschar and does not present as a new wound- it would take time for the skin to evolve to this presentation.        07/07/25 1042   Wound 07/06/25 0012 Right lateral ankle Pressure Injury   Placement Date/Time: 07/06/25 0012   Side: Right  Orientation: lateral  Location: ankle  Primary Wound Type: Pressure Injury   Pressure Injury Stage DTPI   Dressing Appearance intact   Base maroon/purple   Drainage Amount none   Dressing Care silicone border foam   Wound 07/06/25 0012 Right lateral heel Pressure Injury   Placement Date/Time: 07/06/25 0012   Side: Right  Orientation: lateral  Location: heel  Primary Wound Type: Pressure Injury   Pressure Injury Stage DTPI   Dressing Appearance intact   Base maroon/purple   Drainage Amount none   Dressing Care silicone border foam   Wound 07/06/25 0012 Left posterior heel Pressure Injury   Placement Date/Time: 07/06/25 0012   Side: Left  Orientation: posterior  Location: heel  Primary Wound Type: Pressure Injury   Pressure Injury Stage U   Dressing Appearance intact   Base eschar  (hard, dry)   Periwound intact   Drainage Amount none   Dressing Care silicone border foam   Wound 07/06/25 0012 Left lateral ankle Pressure Injury   Placement Date/Time: 07/06/25 0012   Side: Left  Orientation: lateral  Location: ankle  Primary Wound Type: Pressure Injury   Pressure Injury Stage DTPI   Dressing Appearance intact   Base maroon/purple   Drainage Amount none   Dressing Care silicone border foam   Wound 07/06/25  0012 Left lateral foot Pressure Injury   Placement Date/Time: 07/06/25 0012   Side: Left  Orientation: lateral  Location: foot  Primary Wound Type: Pressure Injury   Pressure Injury Stage U   Dressing Appearance intact   Base eschar  (thin, hard, dry)   Drainage Amount none   Dressing Care silicone border foam   Wound 07/06/25 0012 Left medial foot Pressure Injury   Placement Date/Time: 07/06/25 0012   Side: Left  Orientation: medial  Location: foot  Primary Wound Type: Pressure Injury   Dressing Appearance intact   Base maroon/purple   Drainage Amount none   Dressing Care silicone border foam        07/07/25 1042   Wound 07/06/25 0012 Right lateral ankle Pressure Injury   Placement Date/Time: 07/06/25 0012   Side: Right  Orientation: lateral  Location: ankle  Primary Wound Type: Pressure Injury   Pressure Injury Stage DTPI   Dressing Appearance intact   Base maroon/purple   Drainage Amount none   Dressing Care silicone border foam   Wound 07/06/25 0012 Right lateral heel Pressure Injury   Placement Date/Time: 07/06/25 0012   Side: Right  Orientation: lateral  Location: heel  Primary Wound Type: Pressure Injury   Pressure Injury Stage DTPI   Dressing Appearance intact   Base maroon/purple   Drainage Amount none   Dressing Care silicone border foam   Wound 07/06/25 0012 Left posterior heel Pressure Injury   Placement Date/Time: 07/06/25 0012   Side: Left  Orientation: posterior  Location: heel  Primary Wound Type: Pressure Injury   Pressure Injury Stage U   Dressing Appearance intact   Base eschar  (hard, dry)   Periwound intact   Drainage Amount none   Dressing Care silicone border foam   Wound 07/06/25 0012 Left lateral ankle Pressure Injury   Placement Date/Time: 07/06/25 0012   Side: Left  Orientation: lateral  Location: ankle  Primary Wound Type: Pressure Injury   Pressure Injury Stage DTPI   Dressing Appearance intact   Base maroon/purple   Drainage Amount none   Dressing Care silicone border foam   Wound  07/06/25 0012 Left lateral foot Pressure Injury   Placement Date/Time: 07/06/25 0012   Side: Left  Orientation: lateral  Location: foot  Primary Wound Type: Pressure Injury   Pressure Injury Stage U   Dressing Appearance intact   Base eschar  (thin, hard, dry)   Drainage Amount none   Dressing Care silicone border foam   Wound 07/06/25 0012 Left medial foot Pressure Injury   Placement Date/Time: 07/06/25 0012   Side: Left  Orientation: medial  Location: foot  Primary Wound Type: Pressure Injury   Dressing Appearance intact   Base maroon/purple   Drainage Amount none   Dressing Care silicone border foam       Treatment Plan/Recommendations: Continue to off load the heels/feet. Continue protective dressings that protect the medial/lateral feet, ankles, heels.     Wound Team Follow up Plan: WOC team following and will follow up later this week or early next week.

## 2025-07-08 ENCOUNTER — READMISSION MANAGEMENT (OUTPATIENT)
Dept: CALL CENTER | Facility: HOSPITAL | Age: 85
End: 2025-07-08
Payer: MEDICARE

## 2025-07-08 VITALS
BODY MASS INDEX: 22.2 KG/M2 | WEIGHT: 149.91 LBS | SYSTOLIC BLOOD PRESSURE: 109 MMHG | HEART RATE: 57 BPM | RESPIRATION RATE: 18 BRPM | TEMPERATURE: 97.5 F | DIASTOLIC BLOOD PRESSURE: 41 MMHG | OXYGEN SATURATION: 98 % | HEIGHT: 69 IN

## 2025-07-08 LAB
ANION GAP SERPL CALCULATED.3IONS-SCNC: 10 MMOL/L (ref 5–15)
BASOPHILS # BLD AUTO: 0.03 10*3/MM3 (ref 0–0.2)
BASOPHILS NFR BLD AUTO: 0.5 % (ref 0–1.5)
BUN SERPL-MCNC: 22 MG/DL (ref 8–23)
BUN/CREAT SERPL: 25.6 (ref 7–25)
CALCIUM SPEC-SCNC: 9.4 MG/DL (ref 8.6–10.5)
CHLORIDE SERPL-SCNC: 105 MMOL/L (ref 98–107)
CO2 SERPL-SCNC: 23 MMOL/L (ref 22–29)
CREAT SERPL-MCNC: 0.86 MG/DL (ref 0.57–1)
DEPRECATED RDW RBC AUTO: 41.1 FL (ref 37–54)
EGFRCR SERPLBLD CKD-EPI 2021: 66.7 ML/MIN/1.73
EOSINOPHIL # BLD AUTO: 0.24 10*3/MM3 (ref 0–0.4)
EOSINOPHIL NFR BLD AUTO: 3.7 % (ref 0.3–6.2)
ERYTHROCYTE [DISTWIDTH] IN BLOOD BY AUTOMATED COUNT: 12.5 % (ref 12.3–15.4)
GLUCOSE BLDC GLUCOMTR-MCNC: 134 MG/DL (ref 70–130)
GLUCOSE BLDC GLUCOMTR-MCNC: 69 MG/DL (ref 70–130)
GLUCOSE BLDC GLUCOMTR-MCNC: 76 MG/DL (ref 70–130)
GLUCOSE SERPL-MCNC: 109 MG/DL (ref 65–99)
HCT VFR BLD AUTO: 36.7 % (ref 34–46.6)
HGB BLD-MCNC: 11 G/DL (ref 12–15.9)
IMM GRANULOCYTES # BLD AUTO: 0.02 10*3/MM3 (ref 0–0.05)
IMM GRANULOCYTES NFR BLD AUTO: 0.3 % (ref 0–0.5)
LYMPHOCYTES # BLD AUTO: 3.13 10*3/MM3 (ref 0.7–3.1)
LYMPHOCYTES NFR BLD AUTO: 48.3 % (ref 19.6–45.3)
MCH RBC QN AUTO: 27.2 PG (ref 26.6–33)
MCHC RBC AUTO-ENTMCNC: 30 G/DL (ref 31.5–35.7)
MCV RBC AUTO: 90.8 FL (ref 79–97)
MONOCYTES # BLD AUTO: 0.63 10*3/MM3 (ref 0.1–0.9)
MONOCYTES NFR BLD AUTO: 9.7 % (ref 5–12)
NEUTROPHILS NFR BLD AUTO: 2.43 10*3/MM3 (ref 1.7–7)
NEUTROPHILS NFR BLD AUTO: 37.5 % (ref 42.7–76)
NRBC BLD AUTO-RTO: 0 /100 WBC (ref 0–0.2)
PLATELET # BLD AUTO: 338 10*3/MM3 (ref 140–450)
PMV BLD AUTO: 10.2 FL (ref 6–12)
POTASSIUM SERPL-SCNC: 4.7 MMOL/L (ref 3.5–5.2)
RBC # BLD AUTO: 4.04 10*6/MM3 (ref 3.77–5.28)
SODIUM SERPL-SCNC: 138 MMOL/L (ref 136–145)
WBC NRBC COR # BLD AUTO: 6.48 10*3/MM3 (ref 3.4–10.8)

## 2025-07-08 PROCEDURE — 82948 REAGENT STRIP/BLOOD GLUCOSE: CPT

## 2025-07-08 PROCEDURE — 85025 COMPLETE CBC W/AUTO DIFF WBC: CPT | Performed by: INTERNAL MEDICINE

## 2025-07-08 PROCEDURE — 80048 BASIC METABOLIC PNL TOTAL CA: CPT | Performed by: INTERNAL MEDICINE

## 2025-07-08 RX ADMIN — LEVOTHYROXINE SODIUM 100 MCG: 0.1 TABLET ORAL at 09:40

## 2025-07-08 RX ADMIN — POLYETHYLENE GLYCOL 3350 17 G: 17 POWDER, FOR SOLUTION ORAL at 09:40

## 2025-07-08 RX ADMIN — SENNOSIDES AND DOCUSATE SODIUM 2 TABLET: 50; 8.6 TABLET ORAL at 09:40

## 2025-07-08 RX ADMIN — Medication 10 ML: at 09:41

## 2025-07-08 RX ADMIN — Medication 1000 MCG: at 09:40

## 2025-07-08 NOTE — DISCHARGE SUMMARY
Date of Discharge:  7/8/2025    PCP: Terese Song MD    Discharge Diagnosis:   Active Hospital Problems    Diagnosis  POA    **Acute cystitis without hematuria [N30.00]  Yes    Essential hypertension [I10]  Yes    Metabolic encephalopathy [G93.41]  Yes    Hypoglycemia [E16.2]  Yes    Abnormal CT scan of head [R93.0]  Yes    Alzheimer's disease [G30.9, F02.80]  Yes    Anxiety and depression [F41.9, F32.A]  Yes    UTI due to extended-spectrum beta lactamase (ESBL) producing Escherichia coli recurrent [N39.0, B96.29, Z16.12]  Yes    Anemia, chronic disease [D63.8]  Yes      Resolved Hospital Problems   No resolved problems to display.          Consults       Date and Time Order Name Status Description    7/5/2025  1:58 PM Inpatient Cardiology Consult      7/1/2025  3:18 PM [facility] (on-call MD unless specified) Details            Hospital Course  Patient is a 84 y.o. female     Acute Cystitis without Hematuria  - has hx of ESBL  - urine culture growing E.Coli, sensitivities noted-narrow abx from merrem to rocephin  - She has completed Rocephin course.     Metabolic Encephalopathy  - due to above, superimposed on dementia  - monitor with above treatment  - monitor for sundowning and redirect as needed     Hypoglycemia  - A1C 5.90%  - monitor on hypoglycemia protocol  - encourage PO and give nutritional supplementation     HTN  - BP acceptable  - continue atenolol     Hypothyroidism  - continue levothyroxine      Arrhythmia noted, cardiology consult.  She is deemed not a candidate for any further workup.    Patient has medically stabilized, she is going back to rehab facility today, where she is a long-term resident.  I have seen and examined patient at bedside, total time spent on discharge management is more than 30 minutes.  Plan of care was discussed with the patient as well as case management, also discussed with nursing at bedside.    Temp:  [97.3 °F (36.3 °C)-98.6 °F (37 °C)] 97.5 °F (36.4 °C)  Heart Rate:   [59-62] 61  Resp:  [16-18] 16  BP: ()/(36-69) 93/36  Body mass index is 22.14 kg/m².    Physical Exam  Vitals and nursing note reviewed.   Constitutional:       General: She is not in acute distress.     Appearance: She is ill-appearing (chronically). She is not toxic-appearing.   HENT:      Head: Normocephalic and atraumatic.      Nose: Nose normal.      Mouth/Throat:      Mouth: Mucous membranes are moist.      Pharynx: Oropharynx is clear.   Cardiovascular:      Rate and Rhythm: Normal rate and regular rhythm.      Pulses: Normal pulses.   Pulmonary:      Effort: Pulmonary effort is normal.      Breath sounds: Normal breath sounds.   Abdominal:      General: Bowel sounds are normal. There is no distension.      Palpations: Abdomen is soft.      Tenderness: There is no abdominal tenderness.   Musculoskeletal:         General: No swelling or tenderness.      Cervical back: Neck supple.   Skin:     General: Skin is warm and dry.      Capillary Refill: Capillary refill takes less than 2 seconds.   Neurological:      General: No focal deficit present.      Mental Status: She is alert.   Psychiatric:         Cognition and Memory: Cognition is impaired (known dementia).       Disposition: Home or Self Care       Discharge Medications        Continue These Medications        Instructions Start Date   acetaminophen 325 MG tablet  Commonly known as: TYLENOL   650 mg, Oral, Every 4 Hours PRN      albuterol sulfate  (90 Base) MCG/ACT inhaler  Commonly known as: PROVENTIL HFA;VENTOLIN HFA;PROAIR HFA   2 puffs, Every 6 Hours PRN      citric acid-potassium citrate 1100-334 MG/5ML solution  Commonly known as: POLYCITRA   10 mL, Daily      Dulcolax 10 MG suppository  Generic drug: bisacodyl   10 mg, As Needed      guaifenesin 100 MG/5ML liquid  Commonly known as: ROBITUSSIN   200 mg, 3 Times Daily PRN      IRON PO   325 mg, Daily      lactulose 10 GM/15ML solution  Commonly known as: CHRONULAC   20 g, 2 Times Daily  PRN      levothyroxine 75 MCG tablet  Commonly known as: SYNTHROID, LEVOTHROID   75 mcg, Oral, Daily      magnesium hydroxide 400 MG/5ML suspension  Commonly known as: MILK OF MAGNESIA   5 mL, Daily PRN      omeprazole 10 MG capsule  Commonly known as: prilOSEC   10 mg, Daily      polyethylene glycol 17 g packet  Commonly known as: MIRALAX   17 g, Daily      sennosides-docusate 8.6-50 MG per tablet  Commonly known as: PERICOLACE   2 tablets, Daily      vitamin B-12 1000 MCG tablet  Commonly known as: CYANOCOBALAMIN   1,000 mcg, Daily             Stop These Medications      atenolol 25 MG tablet  Commonly known as: TENORMIN     bumetanide 0.5 MG tablet  Commonly known as: BUMEX     modafinil 100 MG tablet  Commonly known as: PROVIGIL               Additional Instructions for the Follow-ups that You Need to Schedule       Discharge Follow-up with PCP   As directed       Currently Documented PCP:    Terese Song MD    PCP Phone Number:    [phone]     Follow Up Details: Follow-up with PCP in 7 days.               Contact information for follow-up providers       Terese Song MD .    Specialty: Internal Medicine  Why: Follow-up with PCP in 7 days.  Contact information:  [address] [address] [address]  [phone]                       Contact information for after-discharge care       Destination       [facility] .    Service: Nursing Home  Contact information:  [address] [id number] [phone]                                No future appointments.     Austin Ruiz MD  [facility]  07/08/25 12:45 EDT    Discharge time spent greater than 30 minutes.

## 2025-07-08 NOTE — PLAN OF CARE
Goal Outcome Evaluation:              Outcome Evaluation: 84 year old admitted with acute cystitis. oriented to self. sinus caio with history of low blood glucose. jus care performed. room air. medications given per order.

## 2025-07-08 NOTE — DISCHARGE PLACEMENT REQUEST
"Ayan Murray (84 y.o. Female)       Date of Birth   1940    Social Security Number       Address   [address] [place] [address]    Home Phone   [phone]    MRN   [id number]       [facility]    Marital Status                               Admission Date   7/1/2025    Admission Type   Emergency    Admitting Provider   Edwar Rdz MD    Attending Provider   Austin Ruiz MD    Department, Room/Bed   [address], [address]/1       Discharge Date       Discharge Disposition   Home or Self Care    Discharge Destination                                 Attending Provider: Austin Ruiz MD    Allergies: Levaquin [Levofloxacin], Macrobid [Nitrofurantoin Monohyd Macro], Penicillins, Sulfa Antibiotics    Isolation: None   Infection: None   Code Status: Prior    Ht: 175.3 cm (69\")   Wt: 68 kg (149 lb 14.6 oz)    Admission Cmt: None   Principal Problem: Acute cystitis without hematuria [N30.00]                   Active Insurance as of 7/1/2025       Primary Coverage       Payor Plan Insurance Group Employer/Plan Group    MEDICARE MEDICARE A & B        Payor Plan Address Payor Plan Phone Number Payor Plan Fax Number Effective Dates    [address] [phone]  11/1/2000 - None Entered    [facility] [id number]         Subscriber Name Subscriber Birth Date Member ID       AYAN MURRAY 1940 [id number]               Secondary Coverage       Payor Plan Insurance Group Employer/Plan Group    [place]        Payor Plan Address Payor Plan Phone Number Payor Plan Fax Number Effective Dates    [address] [phone]  7/1/2025 - None Entered    [place] [address]         Subscriber Name Subscriber Birth Date Member ID       AYAN MURRAY 1940 [id number]                     Emergency Contacts        (Rel.) Home Phone Work Phone Mobile Phone    MARLO VALENZUELA (Legal Guardian) [phone] -- " [phone]    Thomas Tavares (Son) [phone] -- --    Yohannes Carroll (Brother) -- -- [phone]                 Discharge Summary        Austin Ruiz MD at 07/08/25 1245          Date of Discharge:  7/8/2025    PCP: Terese Song MD    Discharge Diagnosis:   Active Hospital Problems    Diagnosis  POA    **Acute cystitis without hematuria [N30.00]  Yes    Essential hypertension [I10]  Yes    Metabolic encephalopathy [G93.41]  Yes    Hypoglycemia [E16.2]  Yes    Abnormal CT scan of head [R93.0]  Yes    Alzheimer's disease [G30.9, F02.80]  Yes    Anxiety and depression [F41.9, F32.A]  Yes    UTI due to extended-spectrum beta lactamase (ESBL) producing Escherichia coli recurrent [N39.0, B96.29, Z16.12]  Yes    Anemia, chronic disease [D63.8]  Yes      Resolved Hospital Problems   No resolved problems to display.          Consults       Date and Time Order Name Status Description    7/5/2025  1:58 PM Inpatient Cardiology Consult      7/1/2025  3:18 PM [facility] (on-call MD unless specified) Details            Hospital Course  Patient is a 84 y.o. female     Acute Cystitis without Hematuria  - has hx of ESBL  - urine culture growing E.Coli, sensitivities noted-narrow abx from merrem to rocephin  - She has completed Rocephin course.     Metabolic Encephalopathy  - due to above, superimposed on dementia  - monitor with above treatment  - monitor for sundowning and redirect as needed     Hypoglycemia  - A1C 5.90%  - monitor on hypoglycemia protocol  - encourage PO and give nutritional supplementation     HTN  - BP acceptable  - continue atenolol     Hypothyroidism  - continue levothyroxine      Arrhythmia noted, cardiology consult.  She is deemed not a candidate for any further workup.    Patient has medically stabilized, she is going back to rehab facility today, where she is a long-term resident.  I have seen and examined patient at bedside, total time spent on discharge management is more than 30 minutes.   Plan of care was discussed with the patient as well as case management, also discussed with nursing at bedside.    Temp:  [97.3 °F (36.3 °C)-98.6 °F (37 °C)] 97.5 °F (36.4 °C)  Heart Rate:  [59-62] 61  Resp:  [16-18] 16  BP: ()/(36-69) 93/36  Body mass index is 22.14 kg/m².    Physical Exam  Vitals and nursing note reviewed.   Constitutional:       General: She is not in acute distress.     Appearance: She is ill-appearing (chronically). She is not toxic-appearing.   HENT:      Head: Normocephalic and atraumatic.      Nose: Nose normal.      Mouth/Throat:      Mouth: Mucous membranes are moist.      Pharynx: Oropharynx is clear.   Cardiovascular:      Rate and Rhythm: Normal rate and regular rhythm.      Pulses: Normal pulses.   Pulmonary:      Effort: Pulmonary effort is normal.      Breath sounds: Normal breath sounds.   Abdominal:      General: Bowel sounds are normal. There is no distension.      Palpations: Abdomen is soft.      Tenderness: There is no abdominal tenderness.   Musculoskeletal:         General: No swelling or tenderness.      Cervical back: Neck supple.   Skin:     General: Skin is warm and dry.      Capillary Refill: Capillary refill takes less than 2 seconds.   Neurological:      General: No focal deficit present.      Mental Status: She is alert.   Psychiatric:         Cognition and Memory: Cognition is impaired (known dementia).       Disposition: Home or Self Care       Discharge Medications        Continue These Medications        Instructions Start Date   acetaminophen 325 MG tablet  Commonly known as: TYLENOL   650 mg, Oral, Every 4 Hours PRN      albuterol sulfate  (90 Base) MCG/ACT inhaler  Commonly known as: PROVENTIL HFA;VENTOLIN HFA;PROAIR HFA   2 puffs, Every 6 Hours PRN      citric acid-potassium citrate 1100-334 MG/5ML solution  Commonly known as: POLYCITRA   10 mL, Daily      Dulcolax 10 MG suppository  Generic drug: bisacodyl   10 mg, As Needed      guaifenesin 100  MG/5ML liquid  Commonly known as: ROBITUSSIN   200 mg, 3 Times Daily PRN      IRON PO   325 mg, Daily      lactulose 10 GM/15ML solution  Commonly known as: CHRONULAC   20 g, 2 Times Daily PRN      levothyroxine 75 MCG tablet  Commonly known as: SYNTHROID, LEVOTHROID   75 mcg, Oral, Daily      magnesium hydroxide 400 MG/5ML suspension  Commonly known as: MILK OF MAGNESIA   5 mL, Daily PRN      omeprazole 10 MG capsule  Commonly known as: prilOSEC   10 mg, Daily      polyethylene glycol 17 g packet  Commonly known as: MIRALAX   17 g, Daily      sennosides-docusate 8.6-50 MG per tablet  Commonly known as: PERICOLACE   2 tablets, Daily      vitamin B-12 1000 MCG tablet  Commonly known as: CYANOCOBALAMIN   1,000 mcg, Daily             Stop These Medications      atenolol 25 MG tablet  Commonly known as: TENORMIN     bumetanide 0.5 MG tablet  Commonly known as: BUMEX     modafinil 100 MG tablet  Commonly known as: PROVIGIL               Additional Instructions for the Follow-ups that You Need to Schedule       Discharge Follow-up with PCP   As directed       Currently Documented PCP:    Terese Song MD    PCP Phone Number:    [phone]     Follow Up Details: Follow-up with PCP in 7 days.               Contact information for follow-up providers       Terese Song MD .    Specialty: Internal Medicine  Why: Follow-up with PCP in 7 days.  Contact information:  [address] [address] [address]  [phone]                       Contact information for after-discharge care       Destination       [facility] .    Service: Nursing Home  Contact information:  [address] [facility] [address] [phone]                                No future appointments.     Austin Ruiz MD  [facility]  07/08/25 12:45 EDT    Discharge time spent greater than 30 minutes.    Electronically signed by Austin Ruiz MD at 07/08/25 5042

## 2025-07-08 NOTE — CASE MANAGEMENT/SOCIAL WORK
Continued Stay Note  [facility]     Patient Name: Madeleine Tavares  MRN: [id number]  Today's Date: 7/8/2025    Admit Date: 7/1/2025    Plan: [facility] when medically ready   Discharge Plan       Row Name 07/08/25 0755       Plan    Plan [facility] when medically ready    Patient/Family in Agreement with Plan yes    Plan Comments Clinicals reviewed. DC plans remain unchanged; plans to return to [facility] when medically ready. Tami RN/CCP                   Discharge Codes    No documentation.                 Expected Discharge Date and Time       Expected Discharge Date Expected Discharge Time    Jul 8, 2025               Brenda Harris RN

## 2025-07-08 NOTE — CASE MANAGEMENT/SOCIAL WORK
Continued Stay Note  [facility]     Patient Name: Madeleine Tavares  MRN: [id number]  Today's Date: 7/8/2025    Admit Date: 7/1/2025    Plan: [facility] via [facility]   Discharge Plan       Row Name 07/08/25 1302       Plan    Plan [facility] via [facility]    Patient/Family in Agreement with Plan yes    Plan Comments DC orders noted. Mellissa/Guardian notified. Mica/John notified. Pt submitted to EMS dispatch. Packet given to BRANDON. Tami TAYLOR/CCP    Final Discharge Disposition Code 04 - intermediate care facility    Final Note [facility] via [facility]. No additional CCP needs.                   Discharge Codes    No documentation.                 Expected Discharge Date and Time       Expected Discharge Date Expected Discharge Time    Jul 8, 2025               Brenda Harris RN

## 2025-07-08 NOTE — OUTREACH NOTE
Prep Survey      Flowsheet Row Responses   Congregational facility patient discharged from? [facility]   Is LACE score < 7 ? No   Eligibility Not Eligible   What are the reasons patient is not eligible? [facility]   Does the patient have one of the following disease processes/diagnoses(primary or secondary)? Other   Prep survey completed? Yes            BONNY ZAVALA - Registered Nurse

## 2025-07-08 NOTE — PLAN OF CARE
Goal Outcome Evaluation:           Progress: improving  Outcome Evaluation: Patient AO x1 will bve discharge today to [facility]  will be taking by [facility] given discharge instructions

## 2025-07-08 NOTE — CASE MANAGEMENT/SOCIAL WORK
Case Management Discharge Note      Final Note: [facility] via [facility]. No additional CCP needs.         Selected Continued Care - Admitted Since 7/1/2025       Destination Coordination complete.      Service Provider Services Address Phone Fax Patient Preferred    [address] [address] [facility] [address] [phone] --              Durable Medical Equipment    No services have been selected for the patient.                Dialysis/Infusion    No services have been selected for the patient.                Home Medical Care    No services have been selected for the patient.                Therapy    No services have been selected for the patient.                Community Resources    No services have been selected for the patient.                Community & DME    No services have been selected for the patient.                         Final Discharge Disposition Code: 04 - intermediate care facility

## 2025-07-08 NOTE — PROGRESS NOTES
Name: Madeleine Tavares ADMIT: 2025   : 1940  PCP: Terese Song MD    MRN: [id number] LOS: 5 days   AGE/SEX: 84 y.o. female  ROOM: [facility]     Subjective   Subjective   Patient is seen at bedside.       Objective   Objective   Vital Signs  Temp:  [97.3 °F (36.3 °C)-98.2 °F (36.8 °C)] 97.3 °F (36.3 °C)  Heart Rate:  [53-66] 60  Resp:  [18] 18  BP: ()/(50-62) 111/50  SpO2:  [95 %-100 %] 100 %  on   ;   Device (Oxygen Therapy): room air  Body mass index is 22.14 kg/m².  Physical Exam  Vitals and nursing note reviewed.   Constitutional:       General: She is not in acute distress.     Appearance: She is ill-appearing (chronically). She is not toxic-appearing.   HENT:      Head: Normocephalic and atraumatic.      Nose: Nose normal.      Mouth/Throat:      Mouth: Mucous membranes are moist.      Pharynx: Oropharynx is clear.   Cardiovascular:      Rate and Rhythm: Normal rate and regular rhythm.      Pulses: Normal pulses.   Pulmonary:      Effort: Pulmonary effort is normal.      Breath sounds: Normal breath sounds.   Abdominal:      General: Bowel sounds are normal. There is no distension.      Palpations: Abdomen is soft.      Tenderness: There is no abdominal tenderness.   Musculoskeletal:         General: No swelling or tenderness.      Cervical back: Neck supple.   Skin:     General: Skin is warm and dry.      Capillary Refill: Capillary refill takes less than 2 seconds.   Neurological:      General: No focal deficit present.      Mental Status: She is alert.   Psychiatric:         Cognition and Memory: Cognition is impaired (known dementia).      Copied text material from yesterday's note has been reviewed for appropriate changes and remains accurate as of 25.      Results Review     I reviewed the patient's new clinical results.  Results from last 7 days   Lab Units 25  0338 25  0409 25  0437 25  0408   WBC 10*3/mm3 6.55 5.75 6.66 6.35   HEMOGLOBIN g/dL 10.6*  11.3* 11.0* 11.4*   PLATELETS 10*3/mm3 339 327 321 328     Results from last 7 days   Lab Units 07/07/25  0338 07/06/25  0409 07/05/25 0437 07/04/25  0408   SODIUM mmol/L 136 137 134* 137   POTASSIUM mmol/L 5.0 4.6 4.9 4.6   CHLORIDE mmol/L 104 105 104 107   CO2 mmol/L 23.0 23.0 23.0 20.0*   BUN mg/dL 21.0 14.0 13.0 10.0   CREATININE mg/dL 1.04* 0.91 0.96 0.96   GLUCOSE mg/dL 101* 89 85 76   EGFR mL/min/1.73 53.1* 62.3 58.5* 58.5*     Results from last 7 days   Lab Units 07/02/25 0322 07/01/25  1414   ALBUMIN g/dL 3.1* 3.5   BILIRUBIN mg/dL <0.2 0.2   ALK PHOS U/L 66 72   AST (SGOT) U/L 16 18   ALT (SGPT) U/L 10 6     Results from last 7 days   Lab Units 07/07/25  0338 07/06/25  0409 07/05/25 0437 07/04/25  0408 07/03/25  0316 07/02/25 0322 07/01/25  1414   CALCIUM mg/dL 9.1 9.3 9.2 9.2   < > 9.3 9.7   ALBUMIN g/dL  --   --   --   --   --  3.1* 3.5   MAGNESIUM mg/dL  --  1.9  --   --   --  2.0  --    PHOSPHORUS mg/dL  --  3.1  --   --   --  3.1  --     < > = values in this interval not displayed.       Glucose   Date/Time Value Ref Range Status   07/07/2025 1609 97 70 - 130 mg/dL Final   07/07/2025 1103 98 70 - 130 mg/dL Final   07/07/2025 0617 140 (H) 70 - 130 mg/dL Final   07/07/2025 0337 111 70 - 130 mg/dL Final   07/06/2025 2122 136 (H) 70 - 130 mg/dL Final   07/06/2025 1617 93 70 - 130 mg/dL Final   07/06/2025 1111 80 70 - 130 mg/dL Final       No radiology results for the last day    I have personally reviewed all medications:  Scheduled Medications  insulin lispro, 2-7 Units, Subcutaneous, 4x Daily AC & at Bedtime  levothyroxine, 100 mcg, Oral, Daily  pantoprazole, 40 mg, Oral, Q AM  polyethylene glycol, 17 g, Oral, Daily  sennosides-docusate, 2 tablet, Oral, Daily  sodium chloride, 10 mL, Intravenous, Q12H  vitamin B-12, 1,000 mcg, Oral, Daily    Infusions   Diet  Diet: Regular/House; Texture: Mechanical Ground (NDD 2); Fluid Consistency: Thin (IDDSI 0)    I have personally reviewed:  [x]  Laboratory    [x]  Microbiology   [x]  Radiology   [x]  EKG/Telemetry  [x]  Cardiology/Vascular   []  Pathology    []  Records       Assessment/Plan     Active Hospital Problems    Diagnosis  POA    **Acute cystitis without hematuria [N30.00]  Yes    Essential hypertension [I10]  Yes    Metabolic encephalopathy [G93.41]  Yes    Hypoglycemia [E16.2]  Yes    Abnormal CT scan of head [R93.0]  Yes    Alzheimer's disease [G30.9, F02.80]  Yes    Anxiety and depression [F41.9, F32.A]  Yes    UTI due to extended-spectrum beta lactamase (ESBL) producing Escherichia coli recurrent [N39.0, B96.29, Z16.12]  Yes    Anemia, chronic disease [D63.8]  Yes      Resolved Hospital Problems   No resolved problems to display.       84 y.o. female admitted with Acute cystitis without hematuria.    Acute Cystitis without Hematuria  - has hx of ESBL  - urine culture growing E.Coli, sensitivities noted-narrow abx from merrem to rocephin  - She has completed Rocephin course.     Metabolic Encephalopathy  - due to above, superimposed on dementia  - monitor with above treatment  - monitor for sundowning and redirect as needed     Hypoglycemia  - A1C 5.90%  - monitor on hypoglycemia protocol  - encourage PO and give nutritional supplementation     HTN  - BP acceptable  - continue atenolol     Hypothyroidism  - continue levothyroxine      Arrhythmia noted, cardiology consult.  She is deemed not a candidate for any further workup.     SCDs for DVT prophylaxis.  Full code.  Discussed with patient and nursing staff.  Expected Discharge Date: 7/8/2025; Expected Discharge Time:       Austin Ruiz MD  [facility]  07/07/25  20:23 EDT